# Patient Record
Sex: MALE | Race: WHITE | Employment: FULL TIME | URBAN - METROPOLITAN AREA
[De-identification: names, ages, dates, MRNs, and addresses within clinical notes are randomized per-mention and may not be internally consistent; named-entity substitution may affect disease eponyms.]

---

## 2017-05-03 ENCOUNTER — HOSPITAL ENCOUNTER (OUTPATIENT)
Dept: PHYSICAL THERAPY | Age: 47
Discharge: HOME | End: 2017-05-03
Payer: COMMERCIAL

## 2017-05-03 PROCEDURE — 97162 PT EVAL MOD COMPLEX 30 MIN: CPT

## 2017-05-04 NOTE — PROGRESS NOTES
Ambulatory/Rehab Services H2 Model Falls Risk Assessment    Risk Factor Pts. ·   Confusion/Disorientation/Impulsivity  []    4 ·   Symptomatic Depression  []   2 ·   Altered Elimination  []   1 ·   Dizziness/Vertigo  []   1 ·   Gender (Male)  [x]   1 ·   Any administered antiepileptics (anticonvulsants):  []   2 ·   Any administered benzodiazepines:  []   1 ·   Visual Impairment (specify):  []   1 ·   Portable Oxygen Use  []   1 ·   Orthostatic ? BP  []   1 ·   History of Recent Falls (within 3 mos.)  []   5     Ability to Rise from Chair (choose one) Pts. ·   Ability to rise in a single movement  [x]   0 ·   Pushes up, successful in one attempt  []   1 ·   Multiple attempts, but successful  []   3 ·   Unable to rise without assistance  []   4   Total: (5 or greater = High Risk) 1     Falls Prevention Plan:   []                Physical Limitations to Exercise (specify):   []                Mobility Assistance Device (type):   []                Exercise/Equipment Adaptation (specify):    ©2010 [facility] [address] Patent #[id number].  Federal Law prohibits the replication, distribution or use without written permission from [facility] [employer]

## 2017-05-04 NOTE — PROGRESS NOTES
Riri Hall  : 1970 [facility] at [address] [place] [address]  [phone]   [phone]        OUTPATIENT PHYSICAL THERAPY:Initial Assessment and Daily Note 5/3/2017    ICD-10: Treatment Diagnosis: cervicalgia M54.2  ICD-10: Treatment Diagnosis: low back pain M54.5  Precautions/Allergies: penicillin   Fall Risk Score: 1 (? 5 = High Risk)  MD Orders: self-referred MEDICAL/REFERRING DIAGNOSIS:Low back pain [M54.5]   DATE OF ONSET: chronic, 2009(lumbar spine),  cervical spine  REFERRING PHYSICIAN: Referred, Self, MD  RETURN PHYSICIAN APPOINTMENT: as needed     INITIAL ASSESSMENT:  Mr. Viv Rubio is a 52 y.o. male who presents to physical therapy with chronic cervical and lumbar spine pain. He presents with cervical, thoracic and lumbar spine arthrokinematic dysfunctions, soft tissue restrictions, postural deficits and strength deficits. He would benefit from physical therapy to improve overall mobility and function. PROBLEM LIST (Impacting functional limitations):  1. Decreased Strength  2. Decreased ADL/Functional Activities  3. Decreased Ambulation Ability/Technique  4. Increased Pain  5. Decreased Activity Tolerance  6. Decreased Flexibility/Joint Mobility INTERVENTIONS PLANNED:  1. Home Exercise Program (HEP)  2. Manual Therapy  3. Neuromuscular Re-education/Strengthening  4. Range of Motion (ROM)  5. Therapeutic Activites  6. Therapeutic Exercise/Strengthening   TREATMENT PLAN:  Effective Dates: 5/3/17 TO 17. Frequency/Duration: 1x a week for 4 weeks. GOALS: (Goals have been discussed and agreed upon with patient.)  Short-Term Functional Goals: Time Frame: 2 weeks  1. Patient demonstrates independence with his HEP without verbal cueing from therapist.  2. Patient able to sleep for 6 consecutive hours without awoke from pain. Discharge Goals: Time Frame: 4 weeks  1.  Patient improve core and postural stability strength to 4/5 to perform ADLs  2. Patient demonstrates neutral pelvic sitting and standing posture without verbal cueing from therapist.   3. Improve Oswestry outcome measure score from 15/50 to 10/50 to perform ADLs. Rehabilitation Potential For Stated Goals: Excellent  Regarding Shaw Aldrich's therapy, I certify that the treatment plan above will be carried out by a therapist or under their direction. Thank you for this referral,  Modesto Fofana PT     Referring Physician Signature: Referred, Self, MD              Date                    The information in this section was collected on 5/3/17 (except where otherwise noted). HISTORY:   History of Present Injury/Illness (Reason for Referral):  Chronic history of low back pain since March 2009, with relief from therapy services. He continues to perform exercises that were taught to him for his low back pain. He also has chronic cervical spine pain which started from a work injury in 2016, with referral into RUE symptoms including tingling/numbness into right hand. Increased symptoms with exercises, sleeping, sitting, standing and housework. Increased headaches noted. Patient denies dizziness, drop attacks, numbness/tingling(exception of RUE), bowel/bladder dysfunction and/or unexplained weight gain/loss. Past Medical History/Comorbidities:   Mr. Shilo quintana has past medical history of anxiety, joint pain and recurrent headaches. Social History/Living Environment:     Lives in an apartment, has three boys. 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 works out daily including aerobics, [employer] and yoga. Dominant Side:         RIGHT  Current Medications:     No current outpatient prescriptions on file.    Date Last Reviewed:  5/3/17   Number of Personal Factors/Comorbidities that affect the Plan of Care: 1-2: MODERATE COMPLEXITY   EXAMINATION: Observation/Orthostatic Postural Assessment:          Patient denies any increase of symptoms with coughing, sneezing or valsalva maneuver. Patient denies any changes in vision, dizziness, vertigo, nausea, drop attacks, black outs, tinnitus, dysphagia, dysarthria, LE symptoms or bowel/bladder dysfunction. Patient exhibits a neutral cervical lordosis and slight increased thoracic kyphosis with convex right type I curve mid-thoracic spine. Patient exhibits a increased lumbar lordosis. Shoulder symmetry exhibits bilateral protraction. Shoulder girdles are right elevated compared to left. Scapular position is right elevated. Clavicles are right elevated. Lower quadrant bony landmarks are left iliac crest elevated, level greater trochanters. Vertical compression test (VCT) for alignment is 3. Elbow flexion test (EFT) for motor activation is 3. Soft tissue observations indicates restrictions noted in anterior chest, anterior cervical spine, right greater than left upper trapezius and levator scapula, scalenes, bilateral pectoralis major/minor, bilateral iliopsoas and hamstrings. Palpation:  Myofascial assessment indicates restriction noted in anterior chest. Muscle length testing levator scapulae with ipsilateral arm abduction is restricted right. Upper trapezius length is restricted right. Pectoralis minor/major and latissimus dorsi exhibit restricted right greater than left. Breathing assessment indicates decreased inlation right. Ist rib palpation exhibits decreased p/a and inferior glides of right 1st rib. Scalene muscle length is restricted right greater than left. ROM: Gross active cervical spine rotation is 80% available on the R and 80% available of the L. Active cervical spine flexion is 5% restricted. Active cervical spine extension is 5% restricted. R side bending is 4 finger breaths ear to shoulder. L side bending is 4 finger breaths ear to shoulder.   Upper cervical active nodding/tilting is restricted right. Mid/lower cervical spine PROM is decreased right a/p glide C5-7. FRS right at L4-5 with limitations into extension, rotation and side bending left, moderate restrictions. Strength: Functional strength testing through cervical spine 4-/5. Postural stability strength 4-/5. Core stability 3+/5, unable to activate L4-S1 left multifidi    Special Tests:   ·  Sharp-Ta test is not tested  ·  cranial atlas lift is negative  ·  [place]-Axis shear test is negative  ·  [place]-Axis side flexion test for integrity of alar ligament is negative  ·  Tectorial membrane test is negative. ·  Spurling test is negative. ·  Cervical distraction is negative. ·  Cervical compression is negative. · Hautant's test is not tested. (Vertebral-Basilar Screen)  ·  Negative bilateral Scours, negative  FABERs  Neurological Screen:  Myotomes: Key muscle strength testing for bilateral UE and LE intact. Dermatomes: Sensation testing through bilateral upper quadrants and lower quadrants for light touch is intact. Reflexes: Biceps (C5), brachioradialis (C6), and triceps (C7) are 2+ and WFL, . Neural tension tests: Upper limb tension test A is negative. Slump test is negative. Functional Mobility:  Restrictions noted with sitting, prolonged standing. Body Structures Involved:  1. Nerves  2. Thoracic Cage  3. Bones  4. Joints  5. Muscles Body Functions Affected:  1. Sensory/Pain  2. Neuromusculoskeletal  3. Movement Related Activities and Participation Affected:  1. Mobility  2. Self Care  3. Community, Social and [place]   Number of elements (examined above) that affect the Plan of Care: 3: MODERATE COMPLEXITY   CLINICAL PRESENTATION:   Presentation: Evolving clinical presentation with changing clinical characteristics: MODERATE COMPLEXITY   CLINICAL DECISION MAKING:   Outcome Measure:    Tool Used: Modified Oswestry Low Back Pain Questionnaire  Score:  Initial: 15/50  Most Recent: X/50 (Date: -- )   Interpretation of Score: Each section is scored on a 0-5 scale, 5 representing the greatest disability. The scores of each section are added together for a total score of 50. Medical Necessity:   · Patient is expected to demonstrate progress in strength, range of motion, coordination and functional technique to increase independence with daily activities, work activities and sitting techniques. .  Reason for Services/Other Comments:  · Patient continues to require skilled intervention due to challenged with performing daily activities secondary to pain levels. .   Use of outcome tool(s) and clinical judgement create a POC that gives a: Questionable prediction of patient's progress: MODERATE COMPLEXITY            TREATMENT:   (In addition to Assessment/Re-Assessment sessions the following treatments were rendered)  Pre-treatment Symptoms/Complaints:  Increased cervical and lumbar spine pain over the past few weeks. Pain: Initial:   Pain Intensity 1: 6  Pain Location 1: Spine, cervical, Spine, thoracic, Spine, lumbar  Pain Orientation 1: Right  Post Session:  4/10     Therapeutic Exercise: (5 Minutes):  Exercises per grid below to improve mobility, strength, balance and coordination. Required minimal verbal and manual cues to promote proper body alignment, promote proper body posture, promote proper body mechanics and promote proper body breathing techniques. Progressed resistance, range, repetitions and complexity of movement as indicated. Date:  5/3/17   Activity/Exercise Parameters   Patient education X 5 minute sleeping positions, anatomy and sitting techniques. Review self scalenes stretch and right 1st rib inferior glide with belt                             Manual Therapy (    Soft Tissue Mobilization Duration  Duration: 5 Minutes): Manual techniques to facilitate improved motion and decreased pain.   · Supine soft tissue mobilization anterior chest and cervical spine  · Supine right 1st rib inferior glide with breathing techniques. Treatment/Session Assessment:    · Response to Treatment:  Improved awareness of prognosis of therapy, improved right 1st rib mobility at end of session. .  · Compliance with Program/Exercises: compliant all of the time. · Recommendations/Intent for next treatment session: \"Next visit will focus on advancements to more challenging activities\".   Total Treatment Duration:  PT Patient Time In/Time Out  Time In: 5000  Time Out: [address] [facility] Baron Dacosta, PT

## 2017-05-09 ENCOUNTER — HOSPITAL ENCOUNTER (OUTPATIENT)
Dept: PHYSICAL THERAPY | Age: 47
Discharge: HOME | End: 2017-05-09
Payer: COMMERCIAL

## 2017-05-09 PROCEDURE — 97110 THERAPEUTIC EXERCISES: CPT

## 2017-05-09 PROCEDURE — 97140 MANUAL THERAPY 1/> REGIONS: CPT

## 2017-05-10 NOTE — PROGRESS NOTES
Simi Grimes  : 1970 [facility] at [address]  [phone]   [phone]        OUTPATIENT PHYSICAL THERAPY:Daily Note 2017    ICD-10: Treatment Diagnosis: cervicalgia M54.2  ICD-10: Treatment Diagnosis: low back pain M54.5  Precautions/Allergies: penicillin   Fall Risk Score: 1 (? 5 = High Risk)  MD Orders: self-referred MEDICAL/REFERRING DIAGNOSIS:Low back pain [M54.5]   DATE OF ONSET: chronic, 2009(lumbar spine),  cervical spine  REFERRING PHYSICIAN: Referred, Self, MD  RETURN PHYSICIAN APPOINTMENT: as needed     INITIAL ASSESSMENT:  Mr. Shilo quintana is a 52 y.o. male who presents to physical therapy with chronic cervical and lumbar spine pain. He presents with cervical, thoracic and lumbar spine arthrokinematic dysfunctions, soft tissue restrictions, postural deficits and strength deficits. He would benefit from physical therapy to improve overall mobility and function. PROBLEM LIST (Impacting functional limitations):  1. Decreased Strength  2. Decreased ADL/Functional Activities  3. Decreased Ambulation Ability/Technique  4. Increased Pain  5. Decreased Activity Tolerance  6. Decreased Flexibility/Joint Mobility INTERVENTIONS PLANNED:  1. Home Exercise Program (HEP)  2. Manual Therapy  3. Neuromuscular Re-education/Strengthening  4. Range of Motion (ROM)  5. Therapeutic Activites  6. Therapeutic Exercise/Strengthening   TREATMENT PLAN:  Effective Dates: 5/3/17 TO 17. Frequency/Duration: 1x a week for 4 weeks. GOALS: (Goals have been discussed and agreed upon with patient.)  Short-Term Functional Goals: Time Frame: 2 weeks  1. Patient demonstrates independence with his HEP without verbal cueing from therapist.  2. Patient able to sleep for 6 consecutive hours without awoke from pain. Discharge Goals: Time Frame: 4 weeks  1.  Patient improve core and postural stability strength to 4/5 to perform ADLs  2. Patient demonstrates neutral pelvic sitting and standing posture without verbal cueing from therapist.   3. Improve Oswestry outcome measure score from 15/50 to 10/50 to perform ADLs. Rehabilitation Potential For Stated Goals: Excellent  Regarding Shaw Aldrich's therapy, I certify that the treatment plan above will be carried out by a therapist or under their direction. Thank you for this referral,  Austin Quezada PT     Referring Physician Signature: Referred, Self, MD              Date                    The information in this section was collected on 5/3/17 (except where otherwise noted). HISTORY:   History of Present Injury/Illness (Reason for Referral):  Chronic history of low back pain since March 2009, with relief from therapy services. He continues to perform exercises that were taught to him for his low back pain. He also has chronic cervical spine pain which started from a work injury in 2016, with referral into RUE symptoms including tingling/numbness into right hand. Increased symptoms with exercises, sleeping, sitting, standing and housework. Increased headaches noted. Patient denies dizziness, drop attacks, numbness/tingling(exception of RUE), bowel/bladder dysfunction and/or unexplained weight gain/loss. Past Medical History/Comorbidities:   Mr. Shilo quintana has past medical history of anxiety, joint pain and recurrent headaches. Social History/Living Environment:     Lives in an apartment, has three boys. 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 works out daily including aerobics, [employer] and yoga. Dominant Side:         RIGHT  Current Medications:     No current outpatient prescriptions on file.    Date Last Reviewed:  5/3/17   Number of Personal Factors/Comorbidities that affect the Plan of Care: 1-2: MODERATE COMPLEXITY   EXAMINATION: Observation/Orthostatic Postural Assessment:          Patient denies any increase of symptoms with coughing, sneezing or valsalva maneuver. Patient denies any changes in vision, dizziness, vertigo, nausea, drop attacks, black outs, tinnitus, dysphagia, dysarthria, LE symptoms or bowel/bladder dysfunction. Patient exhibits a neutral cervical lordosis and slight increased thoracic kyphosis with convex right type I curve mid-thoracic spine. Patient exhibits a increased lumbar lordosis. Shoulder symmetry exhibits bilateral protraction. Shoulder girdles are right elevated compared to left. Scapular position is right elevated. Clavicles are right elevated. Lower quadrant bony landmarks are left iliac crest elevated, level greater trochanters. Vertical compression test (VCT) for alignment is 3. Elbow flexion test (EFT) for motor activation is 3. Soft tissue observations indicates restrictions noted in anterior chest, anterior cervical spine, right greater than left upper trapezius and levator scapula, scalenes, bilateral pectoralis major/minor, bilateral iliopsoas and hamstrings. Palpation:  Myofascial assessment indicates restriction noted in anterior chest. Muscle length testing levator scapulae with ipsilateral arm abduction is restricted right. Upper trapezius length is restricted right. Pectoralis minor/major and latissimus dorsi exhibit restricted right greater than left. Breathing assessment indicates decreased inlation right. Ist rib palpation exhibits decreased p/a and inferior glides of right 1st rib. Scalene muscle length is restricted right greater than left. ROM: Gross active cervical spine rotation is 80% available on the R and 80% available of the L. Active cervical spine flexion is 5% restricted. Active cervical spine extension is 5% restricted. R side bending is 4 finger breaths ear to shoulder. L side bending is 4 finger breaths ear to shoulder.   Upper cervical active nodding/tilting is restricted right. Mid/lower cervical spine PROM is decreased right a/p glide C5-7. FRS right at L4-5 with limitations into extension, rotation and side bending left, moderate restrictions. Strength: Functional strength testing through cervical spine 4-/5. Postural stability strength 4-/5. Core stability 3+/5, unable to activate L4-S1 left multifidi    Special Tests:   ·  Sharp-Ta test is not tested  ·  cranial atlas lift is negative  ·  [place]-Axis shear test is negative  ·  [place]-Axis side flexion test for integrity of alar ligament is negative  ·  Tectorial membrane test is negative. ·  Spurling test is negative. ·  Cervical distraction is negative. ·  Cervical compression is negative. · Hautant's test is not tested. (Vertebral-Basilar Screen)  ·  Negative bilateral Scours, negative  FABERs  Neurological Screen:  Myotomes: Key muscle strength testing for bilateral UE and LE intact. Dermatomes: Sensation testing through bilateral upper quadrants and lower quadrants for light touch is intact. Reflexes: Biceps (C5), brachioradialis (C6), and triceps (C7) are 2+ and WFL, . Neural tension tests: Upper limb tension test A is negative. Slump test is negative. Functional Mobility:  Restrictions noted with sitting, prolonged standing. Body Structures Involved:  1. Nerves  2. Thoracic Cage  3. Bones  4. Joints  5. Muscles Body Functions Affected:  1. Sensory/Pain  2. Neuromusculoskeletal  3. Movement Related Activities and Participation Affected:  1. Mobility  2. Self Care  3. Community, Social and [place]   Number of elements (examined above) that affect the Plan of Care: 3: MODERATE COMPLEXITY   CLINICAL PRESENTATION:   Presentation: Evolving clinical presentation with changing clinical characteristics: MODERATE COMPLEXITY   CLINICAL DECISION MAKING:   Outcome Measure:    Tool Used: Modified Oswestry Low Back Pain Questionnaire  Score:  Initial: 15/50  Most Recent: X/50 (Date: -- )   Interpretation of Score: Each section is scored on a 0-5 scale, 5 representing the greatest disability. The scores of each section are added together for a total score of 50. Medical Necessity:   · Patient is expected to demonstrate progress in strength, range of motion, coordination and functional technique to increase independence with daily activities, work activities and sitting techniques. .  Reason for Services/Other Comments:  · Patient continues to require skilled intervention due to challenged with performing daily activities secondary to pain levels. .   Use of outcome tool(s) and clinical judgement create a POC that gives a: Questionable prediction of patient's progress: MODERATE COMPLEXITY            TREATMENT:   (In addition to Assessment/Re-Assessment sessions the following treatments were rendered)  Pre-treatment Symptoms/Complaints:  Patient notes numbness into RUE when riding his bicycle. Increased discomfort in cervical spine this week and headaches. Pain: Initial:   Pain Intensity 1: 4  Pain Location 1: Spine, cervical  Pain Orientation 1: Right  Post Session:  4/10     Therapeutic Exercise: (15 Minutes):  Exercises per grid below to improve mobility, strength, balance and coordination. Required minimal verbal and manual cues to promote proper body alignment, promote proper body posture, promote proper body mechanics and promote proper body breathing techniques. Progressed resistance, range, repetitions and complexity of movement as indicated. Date:  5/3/17   Activity/Exercise Parameters   Patient education X 5 minute sleeping positions,HEP   Prone prop scapular stability 4 x 30 sec holds   Lower trunk rotation X 5 reps multifidi activation   Seated scalenes stretch X 5 reps, 10 sec holds                 Manual Therapy (    Soft Tissue Mobilization Duration  Duration: 45 Minutes): Manual techniques to facilitate improved motion and decreased pain.   · Supine soft tissue mobilization anterior chest and cervical spine  · Supine right 1st rib inferior glide with breathing techniques. · Supine bony contour soft tissue mobilization to inferior and superior clavicle and coracoid process  · Supine right UE neural glides: median and ulnar nerves      Treatment/Session Assessment:    · Response to Treatment:  Decreased anterior chest soft tissue restrictions, improved awareness of postural stability. Encouraged patient to raise his handle bars of bicycle for improved positioning. · Compliance with Program/Exercises: compliant all of the time. · Recommendations/Intent for next treatment session: \"Next visit will focus on advancements to more challenging activities\".   Total Treatment Duration:  PT Patient Time In/Time Out  Time In: 1430  Time Out: [address] [facility], PT

## 2017-05-16 ENCOUNTER — HOSPITAL ENCOUNTER (OUTPATIENT)
Dept: PHYSICAL THERAPY | Age: 47
Discharge: HOME | End: 2017-05-16
Payer: COMMERCIAL

## 2017-05-16 PROCEDURE — 97110 THERAPEUTIC EXERCISES: CPT

## 2017-05-16 PROCEDURE — 97140 MANUAL THERAPY 1/> REGIONS: CPT

## 2017-05-16 NOTE — PROGRESS NOTES
Sulema Molina  : 1970 [facility]  [facility], [address] [facility]  [phone]   [phone]        OUTPATIENT PHYSICAL THERAPY:Daily Note 2017    ICD-10: Treatment Diagnosis: cervicalgia M54.2  ICD-10: Treatment Diagnosis: low back pain M54.5  Precautions/Allergies: penicillin   Fall Risk Score: 1 (? 5 = High Risk)  MD Orders: self-referred MEDICAL/REFERRING DIAGNOSIS:Low back pain [M54.5]   DATE OF ONSET: chronic, 2009(lumbar spine), 2016 cervical spine  REFERRING PHYSICIAN: Referred, Self, MD  RETURN PHYSICIAN APPOINTMENT: as needed     INITIAL ASSESSMENT:  Mr. Shilo quintana is a 52 y.o. male who presents to physical therapy with chronic cervical and lumbar spine pain. He presents with cervical, thoracic and lumbar spine arthrokinematic dysfunctions, soft tissue restrictions, postural deficits and strength deficits. He would benefit from physical therapy to improve overall mobility and function. PROBLEM LIST (Impacting functional limitations):  1. Decreased Strength  2. Decreased ADL/Functional Activities  3. Decreased Ambulation Ability/Technique  4. Increased Pain  5. Decreased Activity Tolerance  6. Decreased Flexibility/Joint Mobility INTERVENTIONS PLANNED:  1. Home Exercise Program (HEP)  2. Manual Therapy  3. Neuromuscular Re-education/Strengthening  4. Range of Motion (ROM)  5. Therapeutic Activites  6. Therapeutic Exercise/Strengthening   TREATMENT PLAN:  Effective Dates: 5/3/17 TO 17. Frequency/Duration: 1x a week for 4 weeks. GOALS: (Goals have been discussed and agreed upon with patient.)  Short-Term Functional Goals: Time Frame: 2 weeks  1. Patient demonstrates independence with his HEP without verbal cueing from therapist.  2. Patient able to sleep for 6 consecutive hours without awoke from pain. Discharge Goals: Time Frame: 4 weeks  1.  Patient improve core and postural stability strength to 4/5 to perform ADLs  2. Patient demonstrates neutral pelvic sitting and standing posture without verbal cueing from therapist.   3. Improve Oswestry outcome measure score from 15/50 to 10/50 to perform ADLs. Rehabilitation Potential For Stated Goals: Excellent  Regarding Shaw Aldrich's therapy, I certify that the treatment plan above will be carried out by a therapist or under their direction. Thank you for this referral,  Mandy Alejandro PT     Referring Physician Signature: Referred, Self, MD              Date                    The information in this section was collected on 5/3/17 (except where otherwise noted). HISTORY:   History of Present Injury/Illness (Reason for Referral):  Chronic history of low back pain since March 2009, with relief from therapy services. He continues to perform exercises that were taught to him for his low back pain. He also has chronic cervical spine pain which started from a work injury in 2016, with referral into RUE symptoms including tingling/numbness into right hand. Increased symptoms with exercises, sleeping, sitting, standing and housework. Increased headaches noted. Patient denies dizziness, drop attacks, numbness/tingling(exception of RUE), bowel/bladder dysfunction and/or unexplained weight gain/loss. Past Medical History/Comorbidities:   Mr. Shilo quintana has past medical history of anxiety, joint pain and recurrent headaches. Social History/Living Environment:     Lives in an apartment, has three boys. 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 works out daily including aerobics, [employer] and yoga. Dominant Side:         RIGHT  Current Medications:     No current outpatient prescriptions on file.    Date Last Reviewed:  5/3/17   Number of Personal Factors/Comorbidities that affect the Plan of Care: 1-2: MODERATE COMPLEXITY   EXAMINATION: Observation/Orthostatic Postural Assessment:          Patient denies any increase of symptoms with coughing, sneezing or valsalva maneuver. Patient denies any changes in vision, dizziness, vertigo, nausea, drop attacks, black outs, tinnitus, dysphagia, dysarthria, LE symptoms or bowel/bladder dysfunction. Patient exhibits a neutral cervical lordosis and slight increased thoracic kyphosis with convex right type I curve mid-thoracic spine. Patient exhibits a increased lumbar lordosis. Shoulder symmetry exhibits bilateral protraction. Shoulder girdles are right elevated compared to left. Scapular position is right elevated. Clavicles are right elevated. Lower quadrant bony landmarks are left iliac crest elevated, level greater trochanters. Vertical compression test (VCT) for alignment is 3. Elbow flexion test (EFT) for motor activation is 3. Soft tissue observations indicates restrictions noted in anterior chest, anterior cervical spine, right greater than left upper trapezius and levator scapula, scalenes, bilateral pectoralis major/minor, bilateral iliopsoas and hamstrings. Palpation:  Myofascial assessment indicates restriction noted in anterior chest. Muscle length testing levator scapulae with ipsilateral arm abduction is restricted right. Upper trapezius length is restricted right. Pectoralis minor/major and latissimus dorsi exhibit restricted right greater than left. Breathing assessment indicates decreased inlation right. Ist rib palpation exhibits decreased p/a and inferior glides of right 1st rib. Scalene muscle length is restricted right greater than left. ROM: Gross active cervical spine rotation is 80% available on the R and 80% available of the L. Active cervical spine flexion is 5% restricted. Active cervical spine extension is 5% restricted. R side bending is 4 finger breaths ear to shoulder. L side bending is 4 finger breaths ear to shoulder.   Upper cervical active nodding/tilting is restricted right. Mid/lower cervical spine PROM is decreased right a/p glide C5-7. FRS right at L4-5 with limitations into extension, rotation and side bending left, moderate restrictions. Strength: Functional strength testing through cervical spine 4-/5. Postural stability strength 4-/5. Core stability 3+/5, unable to activate L4-S1 left multifidi    Special Tests:   ·  Sharp-Ta test is not tested  ·  cranial atlas lift is negative  ·  [place]-Axis shear test is negative  ·  [place]-Axis side flexion test for integrity of alar ligament is negative  ·  Tectorial membrane test is negative. ·  Spurling test is negative. ·  Cervical distraction is negative. ·  Cervical compression is negative. · Hautant's test is not tested. (Vertebral-Basilar Screen)  ·  Negative bilateral Scours, negative  FABERs  Neurological Screen:  Myotomes: Key muscle strength testing for bilateral UE and LE intact. Dermatomes: Sensation testing through bilateral upper quadrants and lower quadrants for light touch is intact. Reflexes: Biceps (C5), brachioradialis (C6), and triceps (C7) are 2+ and WFL, . Neural tension tests: Upper limb tension test A is negative. Slump test is negative. Functional Mobility:  Restrictions noted with sitting, prolonged standing. Body Structures Involved:  1. Nerves  2. Thoracic Cage  3. Bones  4. Joints  5. Muscles Body Functions Affected:  1. Sensory/Pain  2. Neuromusculoskeletal  3. Movement Related Activities and Participation Affected:  1. Mobility  2. Self Care  3. Community, Social and [place]   Number of elements (examined above) that affect the Plan of Care: 3: MODERATE COMPLEXITY   CLINICAL PRESENTATION:   Presentation: Evolving clinical presentation with changing clinical characteristics: MODERATE COMPLEXITY   CLINICAL DECISION MAKING:   Outcome Measure:    Tool Used: Modified Oswestry Low Back Pain Questionnaire  Score:  Initial: 15/50  Most Recent: X/50 (Date: -- )   Interpretation of Score: Each section is scored on a 0-5 scale, 5 representing the greatest disability. The scores of each section are added together for a total score of 50. Medical Necessity:   · Patient is expected to demonstrate progress in strength, range of motion, coordination and functional technique to increase independence with daily activities, work activities and sitting techniques. .  Reason for Services/Other Comments:  · Patient continues to require skilled intervention due to challenged with performing daily activities secondary to pain levels. .   Use of outcome tool(s) and clinical judgement create a POC that gives a: Questionable prediction of patient's progress: MODERATE COMPLEXITY            TREATMENT:   (In addition to Assessment/Re-Assessment sessions the following treatments were rendered)  Pre-treatment Symptoms/Complaints:  Patient notes exercises for low back are doing well, notes continued tension in cervical spine, especially with computer work. Pain: Initial:   Pain Intensity 1: 4  Pain Location 1: Spine, cervical, Spine, lumbar  Pain Orientation 1: Left, Right  Post Session:  4/10     Therapeutic Exercise: (15 Minutes):  Exercises per grid below to improve mobility, strength, balance and coordination. Required minimal verbal and manual cues to promote proper body alignment, promote proper body posture, promote proper body mechanics and promote proper body breathing techniques. Progressed resistance, range, repetitions and complexity of movement as indicated. Date:  5/3/17   Activity/Exercise Parameters   Patient education X 5 minute review of HEP   Prone prop scapular stability 4 x 30 sec holds   Lower trunk rotation X 5 reps multifidi activation   Seated scalenes stretch X 5 reps, 10 sec holds   Sitting posture review X 5 minute review, neutral pelvic position, LE weight bearing.              Manual Therapy (    Soft Tissue Mobilization Duration  Duration: 45 Minutes): Manual techniques to facilitate improved motion and decreased pain. · Supine soft tissue mobilization anterior chest and cervical spine  · Supine right 1st rib inferior glide with breathing techniques. · Supine bony contour soft tissue mobilization to inferior and superior clavicle and coracoid process  · Supine right UE neural glides: median and ulnar nerves  · Supine bilateral iliopsoas release with FMP of lower trunk rotation  · Prone soft tissue mobilization around bony contours of iliac crest and sacral sulcus. Treatment/Session Assessment:    · Response to Treatment:  Improved mobility noted in pelvis and awareness of sitting position,   · Compliance with Program/Exercises: compliant all of the time. · Recommendations/Intent for next treatment session: \"Next visit will focus on advancements to more challenging activities\".   Total Treatment Duration:  PT Patient Time In/Time Out  Time In: 1040  Time Out: 2025 Vishal Thompson, PT

## 2017-05-22 ENCOUNTER — HOSPITAL ENCOUNTER (OUTPATIENT)
Dept: PHYSICAL THERAPY | Age: 47
Discharge: HOME | End: 2017-05-22
Payer: COMMERCIAL

## 2017-05-22 PROCEDURE — 97110 THERAPEUTIC EXERCISES: CPT

## 2017-05-22 PROCEDURE — 97140 MANUAL THERAPY 1/> REGIONS: CPT

## 2017-05-22 NOTE — PROGRESS NOTES
Tonya Mckeon  : 1970 [facility], [address] [place] [address]  [phone]   [phone]        OUTPATIENT PHYSICAL THERAPY:Daily Note 2017    ICD-10: Treatment Diagnosis: cervicalgia M54.2  ICD-10: Treatment Diagnosis: low back pain M54.5  Precautions/Allergies: penicillin   Fall Risk Score: 1 (? 5 = High Risk)  MD Orders: self-referred MEDICAL/REFERRING DIAGNOSIS:Low back pain [M54.5]   DATE OF ONSET: chronic, 2009(lumbar spine), 2016 cervical spine  REFERRING PHYSICIAN: Referred, Self, MD  RETURN PHYSICIAN APPOINTMENT: as needed     INITIAL ASSESSMENT:  Mr. Shilo quintana is a 52 y.o. male who presents to physical therapy with chronic cervical and lumbar spine pain. He presents with cervical, thoracic and lumbar spine arthrokinematic dysfunctions, soft tissue restrictions, postural deficits and strength deficits. He would benefit from physical therapy to improve overall mobility and function. PROBLEM LIST (Impacting functional limitations):  1. Decreased Strength  2. Decreased ADL/Functional Activities  3. Decreased Ambulation Ability/Technique  4. Increased Pain  5. Decreased Activity Tolerance  6. Decreased Flexibility/Joint Mobility INTERVENTIONS PLANNED:  1. Home Exercise Program (HEP)  2. Manual Therapy  3. Neuromuscular Re-education/Strengthening  4. Range of Motion (ROM)  5. Therapeutic Activites  6. Therapeutic Exercise/Strengthening   TREATMENT PLAN:  Effective Dates: 5/3/17 TO 17. Frequency/Duration: 1x a week for 4 weeks. GOALS: (Goals have been discussed and agreed upon with patient.)  Short-Term Functional Goals: Time Frame: 2 weeks  1. Patient demonstrates independence with his HEP without verbal cueing from therapist.  2. Patient able to sleep for 6 consecutive hours without awoke from pain. Discharge Goals: Time Frame: 4 weeks  1.  Patient improve core and postural stability strength to 4/5 to perform ADLs  2. Patient demonstrates neutral pelvic sitting and standing posture without verbal cueing from therapist.   3. Improve Oswestry outcome measure score from 15/50 to 10/50 to perform ADLs. Rehabilitation Potential For Stated Goals: Excellent  Regarding Shaw Aldrich's therapy, I certify that the treatment plan above will be carried out by a therapist or under their direction. Thank you for this referral,  Marilee Rosas PT     Referring Physician Signature: Referred, Self, MD              Date                    The information in this section was collected on 5/3/17 (except where otherwise noted). HISTORY:   History of Present Injury/Illness (Reason for Referral):  Chronic history of low back pain since March 2009, with relief from therapy services. He continues to perform exercises that were taught to him for his low back pain. He also has chronic cervical spine pain which started from a work injury in 2016, with referral into RUE symptoms including tingling/numbness into right hand. Increased symptoms with exercises, sleeping, sitting, standing and housework. Increased headaches noted. Patient denies dizziness, drop attacks, numbness/tingling(exception of RUE), bowel/bladder dysfunction and/or unexplained weight gain/loss. Past Medical History/Comorbidities:   Mr. Eliecer Stubbs has past medical history of anxiety, joint pain and recurrent headaches. Social History/Living Environment:     Lives in an apartment, has three boys. 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 works out daily including aerobics, [employer] and yoga. Dominant Side:         RIGHT  Current Medications:     No current outpatient prescriptions on file.    Date Last Reviewed:  5/22/2017   Number of Personal Factors/Comorbidities that affect the Plan of Care: 1-2: MODERATE COMPLEXITY   EXAMINATION: Observation/Orthostatic Postural Assessment:          Patient denies any increase of symptoms with coughing, sneezing or valsalva maneuver. Patient denies any changes in vision, dizziness, vertigo, nausea, drop attacks, black outs, tinnitus, dysphagia, dysarthria, LE symptoms or bowel/bladder dysfunction. Patient exhibits a neutral cervical lordosis and slight increased thoracic kyphosis with convex right type I curve mid-thoracic spine. Patient exhibits a increased lumbar lordosis. Shoulder symmetry exhibits bilateral protraction. Shoulder girdles are right elevated compared to left. Scapular position is right elevated. Clavicles are right elevated. Lower quadrant bony landmarks are left iliac crest elevated, level greater trochanters. Vertical compression test (VCT) for alignment is 3. Elbow flexion test (EFT) for motor activation is 3. Soft tissue observations indicates restrictions noted in anterior chest, anterior cervical spine, right greater than left upper trapezius and levator scapula, scalenes, bilateral pectoralis major/minor, bilateral iliopsoas and hamstrings. Palpation:  Myofascial assessment indicates restriction noted in anterior chest. Muscle length testing levator scapulae with ipsilateral arm abduction is restricted right. Upper trapezius length is restricted right. Pectoralis minor/major and latissimus dorsi exhibit restricted right greater than left. Breathing assessment indicates decreased inlation right. Ist rib palpation exhibits decreased p/a and inferior glides of right 1st rib. Scalene muscle length is restricted right greater than left. ROM: Gross active cervical spine rotation is 80% available on the R and 80% available of the L. Active cervical spine flexion is 5% restricted. Active cervical spine extension is 5% restricted. R side bending is 4 finger breaths ear to shoulder. L side bending is 4 finger breaths ear to shoulder.   Upper cervical active nodding/tilting is restricted right. Mid/lower cervical spine PROM is decreased right a/p glide C5-7. FRS right at L4-5 with limitations into extension, rotation and side bending left, moderate restrictions. Strength: Functional strength testing through cervical spine 4-/5. Postural stability strength 4-/5. Core stability 3+/5, unable to activate L4-S1 left multifidi    Special Tests:   ·  Sharp-Ta test is not tested  ·  cranial atlas lift is negative  ·  [place]-Axis shear test is negative  ·  [place]-Axis side flexion test for integrity of alar ligament is negative  ·  Tectorial membrane test is negative. ·  Spurling test is negative. ·  Cervical distraction is negative. ·  Cervical compression is negative. · Hautant's test is not tested. (Vertebral-Basilar Screen)  ·  Negative bilateral Scours, negative  FABERs  Neurological Screen:  Myotomes: Key muscle strength testing for bilateral UE and LE intact. Dermatomes: Sensation testing through bilateral upper quadrants and lower quadrants for light touch is intact. Reflexes: Biceps (C5), brachioradialis (C6), and triceps (C7) are 2+ and WFL, . Neural tension tests: Upper limb tension test A is negative. Slump test is negative. Functional Mobility:  Restrictions noted with sitting, prolonged standing. Body Structures Involved:  1. Nerves  2. Thoracic Cage  3. Bones  4. Joints  5. Muscles Body Functions Affected:  1. Sensory/Pain  2. Neuromusculoskeletal  3. Movement Related Activities and Participation Affected:  1. Mobility  2. Self Care  3. Community, Social and [place]   Number of elements (examined above) that affect the Plan of Care: 3: MODERATE COMPLEXITY   CLINICAL PRESENTATION:   Presentation: Evolving clinical presentation with changing clinical characteristics: MODERATE COMPLEXITY   CLINICAL DECISION MAKING:   Outcome Measure:    Tool Used: Modified Oswestry Low Back Pain Questionnaire  Score:  Initial: 15/50  Most Recent: X/50 (Date: -- )   Interpretation of Score: Each section is scored on a 0-5 scale, 5 representing the greatest disability. The scores of each section are added together for a total score of 50. Medical Necessity:   · Patient is expected to demonstrate progress in strength, range of motion, coordination and functional technique to increase independence with daily activities, work activities and sitting techniques. .  Reason for Services/Other Comments:  · Patient continues to require skilled intervention due to challenged with performing daily activities secondary to pain levels. .   Use of outcome tool(s) and clinical judgement create a POC that gives a: Questionable prediction of patient's progress: MODERATE COMPLEXITY            TREATMENT:   (In addition to Assessment/Re-Assessment sessions the following treatments were rendered)  Pre-treatment Symptoms/Complaints:  Patient notes a few twinges in his low back this weekend, also notes stabbing pain into his right shoulder after working out . Pain: Initial:   Pain Intensity 1: 3  Pain Location 1: Spine, cervical, Spine, lumbar  Pain Orientation 1: Right  Post Session:  2/10     Therapeutic Exercise: (15 Minutes):  Exercises per grid below to improve mobility, strength, balance and coordination. Required minimal verbal and manual cues to promote proper body alignment, promote proper body posture, promote proper body mechanics and promote proper body breathing techniques. Progressed resistance, range, repetitions and complexity of movement as indicated.    Date:  5/22/2017   Activity/Exercise Parameters   Patient education X 5 minute review of HEP   Prone prop scapular stability 4 x 30 sec holds   Lower trunk rotation X 5 reps multifidi activation   Seated scalenes stretch    Sitting posture review    Quadriped pelvic posterior depression, ball into wall X 10 reps, 5 sec holds   Side lying hip abduction/posterior depression against wall X 10 reps, 5 sec holds     Manual Therapy (    Soft Tissue Mobilization Duration  Duration: 45 Minutes): Manual techniques to facilitate improved motion and decreased pain. · Supine soft tissue mobilization anterior chest and cervical spine  · Supine soft tissue mobilization with FMP to right latissimus dorsi and subscapularis  · Supine right 1st rib inferior glide with breathing techniques. · Supine bony contour soft tissue mobilization to inferior and superior clavicle and coracoid process  · Supine right UE neural glides: median and ulnar nerves  · Supine bilateral iliopsoas release with FMP of lower trunk rotation  · Prone soft tissue mobilization around bony contours of iliac crest and sacral sulcus. Treatment/Session Assessment:    · Response to Treatment:  Decreased soft tissue restrictions of right chest and improved position of right shoulder girdle, less protraction noted. Improved awareness of gluteal activation and ability to obtain pelvic posterior depression without tactile cueing. · Compliance with Program/Exercises: compliant all of the time. · Recommendations/Intent for next treatment session: \"Next visit will focus on advancements to more challenging activities\".   Total Treatment Duration:  PT Patient Time In/Time Out  Time In: 8750  Time Out: 1266 [address] Stephy Cullen PT

## 2017-05-31 ENCOUNTER — APPOINTMENT (OUTPATIENT)
Dept: PHYSICAL THERAPY | Age: 47
End: 2017-05-31
Payer: COMMERCIAL

## 2017-06-01 ENCOUNTER — HOSPITAL ENCOUNTER (OUTPATIENT)
Dept: PHYSICAL THERAPY | Age: 47
Discharge: HOME | End: 2017-06-01
Payer: COMMERCIAL

## 2017-06-01 PROCEDURE — 97110 THERAPEUTIC EXERCISES: CPT

## 2017-06-01 PROCEDURE — 97140 MANUAL THERAPY 1/> REGIONS: CPT

## 2017-06-01 NOTE — PROGRESS NOTES
Carlos Marcano  : 1970 [facility] at [address] [place] [address]  [phone]   [phone]        OUTPATIENT PHYSICAL THERAPY:Daily Note and Discharge 2017    ICD-10: Treatment Diagnosis: cervicalgia M54.2  ICD-10: Treatment Diagnosis: low back pain M54.5  Precautions/Allergies: penicillin   Fall Risk Score: 1 (? 5 = High Risk)  MD Orders: self-referred MEDICAL/REFERRING DIAGNOSIS:Low back pain [M54.5]   DATE OF ONSET: chronic, 2009(lumbar spine),  cervical spine  REFERRING PHYSICIAN: Referred, Self, MD  RETURN PHYSICIAN APPOINTMENT: as needed     INITIAL ASSESSMENT:  Mr. Shilo quintana is a 52 y.o. male who presents to physical therapy with chronic cervical and lumbar spine pain. He presents with cervical, thoracic and lumbar spine arthrokinematic dysfunctions, soft tissue restrictions, postural deficits and strength deficits. He would benefit from physical therapy to improve overall mobility and function. 17: Patient has met majority of his physical therapy goals and demonstrates independence with his HEP. He will continue to work independently to improve overall mobility and strength. He attended 5 physical therapy sessions. He will be discharged from therapy at this time. TREATMENT PLAN:  Effective Dates: 5/3/17 TO 17 (correction to dates, allowed 30 days direct access)   Frequency/Duration: 1x a week for 4 weeks. GOALS: (Goals have been discussed and agreed upon with patient.)  Short-Term Functional Goals: Time Frame: 2 weeks  1. Patient demonstrates independence with his HEP without verbal cueing from therapist. GOAL MET  2. Patient able to sleep for 6 consecutive hours without awoke from pain. GOAL MET  Discharge Goals: Time Frame: 4 weeks  1. Patient improve core and postural stability strength to 4/5 to perform ADLs GOAL MET  2.  Patient demonstrates neutral pelvic sitting and standing posture without verbal cueing from therapist. GOAL MET   3. Improve Oswestry outcome measure score from 15/50 to 10/50 to perform ADLs. ALMOST MET  Rehabilitation Potential For Stated Goals: Excellent  Regarding Shaw Aldrich's therapy, I certify that the treatment plan above will be carried out by a therapist or under their direction. Thank you for this referral,  Kerry Garnett PT     Referring Physician Signature: Referred, Self, MD        n/a     Date                    The information in this section was collected on 5/3/17 (except where otherwise noted). HISTORY:   History of Present Injury/Illness (Reason for Referral):  Chronic history of low back pain since March 2009, with relief from therapy services. He continues to perform exercises that were taught to him for his low back pain. He also has chronic cervical spine pain which started from a work injury in 2016, with referral into RUE symptoms including tingling/numbness into right hand. Increased symptoms with exercises, sleeping, sitting, standing and housework. Increased headaches noted. Patient denies dizziness, drop attacks, numbness/tingling(exception of RUE), bowel/bladder dysfunction and/or unexplained weight gain/loss. Past Medical History/Comorbidities:   Mr. Shilo quintana has past medical history of anxiety, joint pain and recurrent headaches. Social History/Living Environment:     Lives in an apartment, has three boys. 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 works out daily including aerobics, [employer] and yoga. Dominant Side:         RIGHT  Current Medications:     No current outpatient prescriptions on file.    Date Last Reviewed:  6/1/2017   Number of Personal Factors/Comorbidities that affect the Plan of Care: 1-2: MODERATE COMPLEXITY   EXAMINATION:   Observation/Orthostatic Postural Assessment:          Patient denies any increase of symptoms with coughing, sneezing or valsalva maneuver. Patient denies any changes in vision, dizziness, vertigo, nausea, drop attacks, black outs, tinnitus, dysphagia, dysarthria, LE symptoms or bowel/bladder dysfunction. Patient exhibits a neutral cervical lordosis and slight increased thoracic kyphosis with convex right type I curve mid-thoracic spine. Patient exhibits a increased lumbar lordosis. Shoulder symmetry exhibits bilateral protraction. Shoulder girdles are right elevated compared to left. Scapular position is right elevated. Clavicles are right elevated. Lower quadrant bony landmarks are left iliac crest elevated, level greater trochanters. Vertical compression test (VCT) for alignment is 3. Elbow flexion test (EFT) for motor activation is 3. Soft tissue observations indicates restrictions noted in anterior chest, anterior cervical spine, right greater than left upper trapezius and levator scapula, scalenes, bilateral pectoralis major/minor, bilateral iliopsoas and hamstrings. improved  Palpation:  Myofascial assessment indicates restriction noted in anterior chest. Muscle length testing levator scapulae with ipsilateral arm abduction is restricted right. Upper trapezius length is restricted right. Pectoralis minor/major and latissimus dorsi exhibit restricted right greater than left. Breathing assessment indicates decreased inlation right. Ist rib palpation exhibits decreased p/a and inferior glides of right 1st rib improved. Scalene muscle length is restricted right greater than left. improved    ROM: Gross active cervical spine rotation is 80% available on the R and 80% available of the L. Active cervical spine flexion is 5% restricted. Active cervical spine extension is 5% restricted. R side bending is 4 finger breaths ear to shoulder. L side bending is 4 finger breaths ear to shoulder. Upper cervical active nodding/tilting is restricted right.   Mid/lower cervical spine PROM is decreased right a/p glide C5-7. FRS right at L4-5 with limitations into extension, rotation and side bending left, moderate restrictions. Strength: Functional strength testing through cervical spine 4-/5. Postural stability strength 4/5. Core stability 4/5, unable to activate L4-S1 left multifidi   improved  Special Tests:   ·  Sharp-Ta test is not tested  ·  cranial atlas lift is negative  ·  [place]-Axis shear test is negative  ·  [place]-Axis side flexion test for integrity of alar ligament is negative  ·  Tectorial membrane test is negative. ·  Spurling test is negative. ·  Cervical distraction is negative. ·  Cervical compression is negative. · Hautant's test is not tested. (Vertebral-Basilar Screen)  ·  Negative bilateral Scours, negative  FABERs  Neurological Screen:  Myotomes: Key muscle strength testing for bilateral UE and LE intact. Dermatomes: Sensation testing through bilateral upper quadrants and lower quadrants for light touch is intact. Reflexes: Biceps (C5), brachioradialis (C6), and triceps (C7) are 2+ and WFL, . Neural tension tests: Upper limb tension test A is negative. Slump test is negative. Functional Mobility:  Restrictions noted with sitting, prolonged standing. Body Structures Involved:  1. Nerves  2. Thoracic Cage  3. Bones  4. Joints  5. Muscles Body Functions Affected:  1. Sensory/Pain  2. Neuromusculoskeletal  3. Movement Related Activities and Participation Affected:  1. Mobility  2. Self Care  3. Community, Social and [place]   Number of elements (examined above) that affect the Plan of Care: 3: MODERATE COMPLEXITY   CLINICAL PRESENTATION:   Presentation: Evolving clinical presentation with changing clinical characteristics: MODERATE COMPLEXITY   CLINICAL DECISION MAKING:   Outcome Measure:    Tool Used: Modified Oswestry Low Back Pain Questionnaire  Score:  Initial: 15/50  Most Recent: 11/50 (Date: 6/1/17)   Interpretation of Score: Each section is scored on a 0-5 scale, 5 representing the greatest disability. The scores of each section are added together for a total score of 50. Medical Necessity:   · Patient is expected to demonstrate progress in strength, range of motion, coordination and functional technique to increase independence with daily activities, work activities and sitting techniques. .  Reason for Services/Other Comments:  · Patient continues to require skilled intervention due to challenged with performing daily activities secondary to pain levels. .   Use of outcome tool(s) and clinical judgement create a POC that gives a: Questionable prediction of patient's progress: MODERATE COMPLEXITY            TREATMENT:   (In addition to Assessment/Re-Assessment sessions the following treatments were rendered)  Pre-treatment Symptoms/Complaints:  Patient notes overall improvements, less pain in cervical and lumbar spine pain. Recently traveled and noted some low back pain, however was able to adjust his posture which helped decrease symptoms. Pain: Initial:   Pain Intensity 1: 2  Pain Location 1: Spine, cervical, Spine, lumbar  Pain Orientation 1: Right  Post Session:  1/10     Therapeutic Exercise: (10 Minutes):  Exercises per grid below to improve mobility, strength, balance and coordination. Required minimal verbal and manual cues to promote proper body alignment, promote proper body posture, promote proper body mechanics and promote proper body breathing techniques. Progressed resistance, range, repetitions and complexity of movement as indicated.    Date:  6/1/2017   Activity/Exercise Parameters   Patient education X 5 minute review of HEP   Prone prop scapular stability 4 x 30 sec holds   Lower trunk rotation X 5 reps multifidi activation   Seated scalenes stretch    Sitting posture review    Quadriped pelvic posterior depression, ball into wall X 10 reps, 5 sec holds   Side lying hip abduction/posterior depression against wall X 10 reps, 5 sec holds     Manual Therapy (    Soft Tissue Mobilization Duration  Duration: 50 Minutes): Manual techniques to facilitate improved motion and decreased pain. · Supine soft tissue mobilization anterior chest and cervical spine  · Supine soft tissue mobilization with FMP to right latissimus dorsi and subscapularis  · Supine right 1st rib inferior glide with breathing techniques. · Supine bony contour soft tissue mobilization to inferior and superior clavicle and coracoid process  · Supine right UE neural glides: median and ulnar nerves  · Supine bilateral iliopsoas release with FMP of lower trunk rotation  · Prone soft tissue mobilization around bony contours of iliac crest and sacral sulcus. · Prone rectus femoris manual stretches with contract/relax techniques  · Prone hip on axis right LE with manual resistance into hip ER/IR    (Used abbreviations: MET - muscle energy technique; PNF - proprioceptive neuromuscular facilitation; NMR - neuromuscular re-education; a/p - anterior to posterior; p/a - posterior to anterior, FMP - functional movement patterns)    Treatment/Session Assessment:    · Response to Treatment:  Improved mobility noted in cervical and lumbar spine, demonstrates independence with his HEP at this time. · Compliance with Program/Exercises: compliant all of the time.     Total Treatment Duration:  PT Patient Time In/Time Out  Time In: 0930  Time Out: [id number] [address] Chio Carter PT

## 2017-08-17 ENCOUNTER — APPOINTMENT (OUTPATIENT)
Dept: PHYSICAL THERAPY | Age: 47
End: 2017-08-17

## 2017-08-28 ENCOUNTER — HOSPITAL ENCOUNTER (OUTPATIENT)
Dept: PHYSICAL THERAPY | Age: 47
Discharge: HOME | End: 2017-08-28
Payer: COMMERCIAL

## 2017-08-28 PROCEDURE — 97162 PT EVAL MOD COMPLEX 30 MIN: CPT

## 2017-08-29 NOTE — PROGRESS NOTES
Jenni Benavides  : 1970 [facility] at [address]  [phone]   [phone]        OUTPATIENT PHYSICAL THERAPY:Initial Assessment 2017    ICD-10: Treatment Diagnosis: low back pain M54.5  ICD-10: Treatment Diagnosis: cervicalgia M54.2  ICD-10: Treatment Diagnosis: pain in joint, right shoulder M25.511  Precautions/Allergies:   Review of patient's allergies indicates not on file. Fall Risk Score: 2 (? 5 = High Risk)  MD Orders: self referred  MEDICAL/REFERRING DIAGNOSIS:  Low back pain [M54.5]   DATE OF ONSET: chronic  REFERRING PHYSICIAN: Referred, Self, MD  RETURN PHYSICIAN APPOINTMENT: as needed     INITIAL ASSESSMENT:  Mr. Shilo quintana is a 52 y.o. male who presents to physical therapy with chronic low back and neck pain. He continues to have right shoulder pain with certain movements. He is challenged with performing daily activities secondary to limited ROM and pain. He presents with cervical, thoracic and lumbar spine arthrokinematic dysfunctions, soft tissue restrictions, postural dysfunction, strength and endurance deficits. He would benefit from skilled physical therapy to improve overall function and mobility. PROBLEM LIST (Impacting functional limitations):  1. Decreased Strength  2. Decreased ADL/Functional Activities  3. Increased Pain  4. Decreased Activity Tolerance  5. Decreased Flexibility/Joint Mobility INTERVENTIONS PLANNED:  1. Home Exercise Program (HEP)  2. Manual Therapy  3. Neuromuscular Re-education/Strengthening  4. Range of Motion (ROM)  5. Therapeutic Activites  6. Therapeutic Exercise/Strengthening   TREATMENT PLAN:  Effective Dates: 17 TO 17. Frequency/Duration: 1 time a week for 4 weeks  GOALS: (Goals have been discussed and agreed upon with patient.)  Discharge Goals: Time Frame: 4 weeks  1.  Patient demonstrates neutral positions in sitting and standing through pelvis and torso without verbal cueing from therapist.  2. Patient improve core stability and postural stability strength to 4+/5 to perform ADLs  3. Patient able to ambulate for 30 minute without onset of pain. 4. Improve Oswestry outcome measure from 8/50 to 0/50. Rehabilitation Potential For Stated Goals: Excellent  Regarding Shaw Aldrich's therapy, I certify that the treatment plan above will be carried out by a therapist or under their direction. Thank you for this referral,  Tasha Lee, PT     Referring Physician Signature: Referred, Self, MD   n/a           Date                    The information in this section was collected on 8/28/17 (except where otherwise noted). HISTORY:   History of Present Injury/Illness (Reason for Referral):  Patient has a chronic cervical and lumbar spine pain and right shoulder pain. He has been able to work independently with improving his low back pain with exercises. He continues to have right anterior shoulder pain and challenged with shoulder movements secondary to pain. Increased discomfort also noted in cervical spine with left rotation and side bending. Patient denies dizziness, drop attacks, numbness/tingling, bowel/bladder dysfunction and/or unexplained weight gain/loss. Past Medical History/Comorbidities:   Mr. Faisal Moore  has no past medical history on file. Mr. Faisal Moore  has no past surgical history on file. Social History/Living Environment:     Lives in an apartment, no physical barriers noted  Prior Level of Function/Work/Activity:  Independent, however challenged with daily and recreational activities secondary to discomfort. Dominant Side:         RIGHT    Current Medications:     No current outpatient prescriptions on file.    Date Last Reviewed:  8/28/2017   Number of Personal Factors/Comorbidities that affect the Plan of Care: 0: LOW COMPLEXITY   EXAMINATION:   Observation/Orthostatic Postural Assessment:          Patient denies any increase of symptoms with coughing, sneezing or valsalva maneuver. Patient denies any headaches, changes in vision, dizziness, vertigo, nausea, drop attacks, black outs, tinnitus, dysphagia, dysarthria, LE symptoms or bowel/bladder dysfunction. Patient exhibits a decreased cervical lordosis and slight increased thoracic kyphosis. Patient exhibits a neutral lumbar lordosis. Shoulder symmetry exhibits right elevated compared to left. Shoulder girdles are bilateral protraction. Scapular position is right elevated and protracted. Clavicles are right elevated. Position of head is left cranial tilt. Lower quadrant bony landmarks are left iliac crest elevated, level greater trochanter. Vertical compression test (VCT) for alignment is 3. Elbow flexion test (EFT) for motor activation is 3. Soft tissue observations indicates restrictions in upper trapezius, levator scapula, pectoralis major/minor, latissimus dorsi, iliopsoas, hamstrings. .  Palpation:  Myofascial assessment indicates anterior chest restrictions. Muscle length testing levator scapulae with ipsilateral arm abduction is restricted right greater than left. Upper trapezius length is restricted right greater than left. Pectoralis minor/major and latissimus dorsi exhibit restricted bilaterally. Breathing assessment indicates decreased inhalation right. Ist rib palpation exhibits decreased inferior glide. Scalene muscle length is restrictions right greater than left. Restrictions noted in bilateral hamstrings, left hip flexors and bilateral piriformis. .     ROM: Gross active cervical spine rotation is 80% available on the R and 70% available of the L. Active cervical spine flexion is 5% restricted. Active cervical spine extension is 5% restricted. R side bending is 3 finger breaths ear to shoulder. L side bending is 3 finger breaths ear to shoulder. Upper cervical active nodding/tilting is restricted right. Upper cervical active rotation is restricted right.  Shoulder abduction with palm down is 90 right, 110 left. PROM C0/1 is sheared left. PROM C1/2 rotation is limited right rotation. Mid/lower cervical spine PROM is restricted right a/p C5-7. Strength: Functional strength testing through cervical spine 4/5. Postural stability strength 4/5. Core stability 4-/5  Special Tests:   ·  Sharp-Ta test is not tested  ·  cranial atlas lift is negative  ·  [place]-Axis shear test is negative  ·  [place]-Axis side flexion test for integrity of alar ligament is negative  ·  Tectorial membrane test is negative. ·  Spurling test is negative. ·  Cervical distraction is negative. ·  Cervical compression is negative. · Hautant's test is not tested. (Vertebral-Basilar Screen)  ·  Cranial extension test is not tested. (Vertebral-Basilar Screen)  Neurological Screen:  Myotomes: Key muscle strength testing for bilateral UE is intact. Dermatomes: Sensation testing through bilateral upper quadrants for light touch is intact. Reflexes: Biceps (C5), brachioradialis (C6), and triceps (C7) are 2+ and WFL. Neural tension tests: Upper limb tension test A is negative. Slump test is positive right. Functional Mobility:  Challenged with neutral posture sitting and standing positions. Body Structures Involved:  1. Nerves  2. Joints  3. Muscles Body Functions Affected:  1. Sensory/Pain  2. Neuromusculoskeletal  3. Movement Related Activities and Participation Affected:  1. General Tasks and Demands  2. Mobility  3. Community, Social and [place]   Number of elements (examined above) that affect the Plan of Care: 3: MODERATE COMPLEXITY   CLINICAL PRESENTATION:   Presentation: Evolving clinical presentation with changing clinical characteristics: MODERATE COMPLEXITY   CLINICAL DECISION MAKING:   Outcome Measure:    Tool Used: Modified Oswestry Low Back Pain Questionnaire  Score:  Initial: 8/50  Most Recent: X/50 (Date: -- )   Interpretation of Score: Each section is scored on a 0-5 scale, 5 representing the greatest disability. The scores of each section are added together for a total score of 50. Medical Necessity:   · Patient is expected to demonstrate progress in strength, range of motion, balance and coordination to increase independence with ADLs and recreational activities. Reason for Services/Other Comments:  · Patient continues to require skilled intervention due to challenged with chronic pain in cervical and lumbar spine. .   Use of outcome tool(s) and clinical judgement create a POC that gives a: Questionable prediction of patient's progress: MODERATE COMPLEXITY            TREATMENT:   (In addition to Assessment/Re-Assessment sessions the following treatments were rendered)  Pre-treatment Symptoms/Complaints:  Patient notes discomfort in cervical spine and right shoulder. Increased discomfort over the weekend secondary to sleeping positions. Pain: Initial: Pain Intensity 1: 5  Pain Location 1: Shoulder, Spine, cervical, Spine, thoracic, Spine, lumbar  Pain Orientation 1: Posterior, Right  Post Session:  4/10     Assessment only today, no treatment provided. Artist Growth  Treatment/Session Assessment:    · Response to Treatment:  Improved awareness of plan of care and prognosis of physical therapy treatments. · Compliance with Program/Exercises: compliant all of the time. · Recommendations/Intent for next treatment session: \"Next visit will focus on advancements to more challenging activities\".   Total Treatment Duration:  PT Patient Time In/Time Out  Time In: 1430  Time Out: [address] Philippe Summers PT

## 2017-08-29 NOTE — PROGRESS NOTES
Ambulatory/Rehab Services H2 Model Falls Risk Assessment    Risk Factor Pts. ·   Confusion/Disorientation/Impulsivity  []    4 ·   Symptomatic Depression  []   2 ·   Altered Elimination  []   1 ·   Dizziness/Vertigo  []   1 ·   Gender (Male)  [x]   1 ·   Any administered antiepileptics (anticonvulsants):  []   2 ·   Any administered benzodiazepines:  []   1 ·   Visual Impairment (specify):  []   1 ·   Portable Oxygen Use  []   1 ·   Orthostatic ? BP  []   1 ·   History of Recent Falls (within 3 mos.)  []   5     Ability to Rise from Chair (choose one) Pts. ·   Ability to rise in a single movement  []   0 ·   Pushes up, successful in one attempt  [x]   1 ·   Multiple attempts, but successful  []   3 ·   Unable to rise without assistance  []   4   Total: (5 or greater = High Risk) 2     Falls Prevention Plan:   []                Physical Limitations to Exercise (specify):   []                Mobility Assistance Device (type):   []                Exercise/Equipment Adaptation (specify):    ©2010 [facility] [address] [place] [address] Patent #[id number].  Federal Law prohibits the replication, distribution or use without written permission from [facility] [employer]

## 2017-09-06 ENCOUNTER — HOSPITAL ENCOUNTER (OUTPATIENT)
Dept: PHYSICAL THERAPY | Age: 47
Discharge: HOME | End: 2017-09-06
Payer: COMMERCIAL

## 2017-09-06 ENCOUNTER — HOSPITAL ENCOUNTER (OUTPATIENT)
Dept: PHYSICAL THERAPY | Age: 47
End: 2017-09-06

## 2017-09-06 NOTE — PROGRESS NOTES
Stephen Avina   (HLD:[id number]) Therapy Center at  [facility] [facility]  [phone]  [phone]             DATE: 9/6/2017    Patient no-showed for appointment today, he forgot what time his appointment was and rescheduled for tomorrow.      Dameon Palacio PT, DPT, CFMT

## 2017-09-07 ENCOUNTER — HOSPITAL ENCOUNTER (OUTPATIENT)
Dept: PHYSICAL THERAPY | Age: 47
Discharge: HOME | End: 2017-09-07
Payer: COMMERCIAL

## 2017-09-07 PROCEDURE — 97140 MANUAL THERAPY 1/> REGIONS: CPT

## 2017-09-07 PROCEDURE — 97110 THERAPEUTIC EXERCISES: CPT

## 2017-09-08 NOTE — PROGRESS NOTES
Marianne Barrett  : [facility] [address]  [phone]   [phone]        OUTPATIENT PHYSICAL THERAPY:Daily Note 2017    ICD-10: Treatment Diagnosis: low back pain M54.5  ICD-10: Treatment Diagnosis: cervicalgia M54.2  ICD-10: Treatment Diagnosis: pain in joint, right shoulder M25.511  Precautions/Allergies:   Review of patient's allergies indicates not on file. Fall Risk Score: 2 (? 5 = High Risk)  MD Orders: self referred  MEDICAL/REFERRING DIAGNOSIS:  Low back pain [M54.5]   DATE OF ONSET: chronic  REFERRING PHYSICIAN: Referred, Self, MD  RETURN PHYSICIAN APPOINTMENT: as needed     INITIAL ASSESSMENT:  Mr. Weston Shepherd is a 52 y.o. male who presents to physical therapy with chronic low back and neck pain. He continues to have right shoulder pain with certain movements. He is challenged with performing daily activities secondary to limited ROM and pain. He presents with cervical, thoracic and lumbar spine arthrokinematic dysfunctions, soft tissue restrictions, postural dysfunction, strength and endurance deficits. He would benefit from skilled physical therapy to improve overall function and mobility. PROBLEM LIST (Impacting functional limitations):  1. Decreased Strength  2. Decreased ADL/Functional Activities  3. Increased Pain  4. Decreased Activity Tolerance  5. Decreased Flexibility/Joint Mobility INTERVENTIONS PLANNED:  1. Home Exercise Program (HEP)  2. Manual Therapy  3. Neuromuscular Re-education/Strengthening  4. Range of Motion (ROM)  5. Therapeutic Activites  6. Therapeutic Exercise/Strengthening   TREATMENT PLAN:  Effective Dates: 17 TO 17. Frequency/Duration: 1 time a week for 4 weeks  GOALS: (Goals have been discussed and agreed upon with patient.)  Discharge Goals: Time Frame: 4 weeks  1.  Patient demonstrates neutral positions in sitting and standing through pelvis and torso without verbal cueing from therapist.  2. Patient improve core stability and postural stability strength to 4+/5 to perform ADLs  3. Patient able to ambulate for 30 minute without onset of pain. 4. Improve Oswestry outcome measure from 8/50 to 0/50. Rehabilitation Potential For Stated Goals: Excellent  Regarding Shaw Aldrich's therapy, I certify that the treatment plan above will be carried out by a therapist or under their direction. Thank you for this referral,  Tank Solis, PT     Referring Physician Signature: Referred, Self, MD   n/a           Date                    The information in this section was collected on 8/28/17 (except where otherwise noted). HISTORY:   History of Present Injury/Illness (Reason for Referral):  Patient has a chronic cervical and lumbar spine pain and right shoulder pain. He has been able to work independently with improving his low back pain with exercises. He continues to have right anterior shoulder pain and challenged with shoulder movements secondary to pain. Increased discomfort also noted in cervical spine with left rotation and side bending. Patient denies dizziness, drop attacks, numbness/tingling, bowel/bladder dysfunction and/or unexplained weight gain/loss. Past Medical History/Comorbidities:   Mr. Shilo quintana  has no past medical history on file. Mr. Shilo quintana  has no past surgical history on file. Social History/Living Environment:     Lives in an apartment, no physical barriers noted  Prior Level of Function/Work/Activity:  Independent, however challenged with daily and recreational activities secondary to discomfort. Dominant Side:         RIGHT    Current Medications:     No current outpatient prescriptions on file.    Date Last Reviewed:  9/7/2017   Number of Personal Factors/Comorbidities that affect the Plan of Care: 0: LOW COMPLEXITY   EXAMINATION:   Observation/Orthostatic Postural Assessment:          Patient denies any increase of symptoms with coughing, sneezing or valsalva maneuver. Patient denies any headaches, changes in vision, dizziness, vertigo, nausea, drop attacks, black outs, tinnitus, dysphagia, dysarthria, LE symptoms or bowel/bladder dysfunction. Patient exhibits a decreased cervical lordosis and slight increased thoracic kyphosis. Patient exhibits a neutral lumbar lordosis. Shoulder symmetry exhibits right elevated compared to left. Shoulder girdles are bilateral protraction. Scapular position is right elevated and protracted. Clavicles are right elevated. Position of head is left cranial tilt. Lower quadrant bony landmarks are left iliac crest elevated, level greater trochanter. Vertical compression test (VCT) for alignment is 3. Elbow flexion test (EFT) for motor activation is 3. Soft tissue observations indicates restrictions in upper trapezius, levator scapula, pectoralis major/minor, latissimus dorsi, iliopsoas, hamstrings. .  Palpation:  Myofascial assessment indicates anterior chest restrictions. Muscle length testing levator scapulae with ipsilateral arm abduction is restricted right greater than left. Upper trapezius length is restricted right greater than left. Pectoralis minor/major and latissimus dorsi exhibit restricted bilaterally. Breathing assessment indicates decreased inhalation right. Ist rib palpation exhibits decreased inferior glide. Scalene muscle length is restrictions right greater than left. Restrictions noted in bilateral hamstrings, left hip flexors and bilateral piriformis. .     ROM: Gross active cervical spine rotation is 80% available on the R and 70% available of the L. Active cervical spine flexion is 5% restricted. Active cervical spine extension is 5% restricted. R side bending is 3 finger breaths ear to shoulder. L side bending is 3 finger breaths ear to shoulder. Upper cervical active nodding/tilting is restricted right. Upper cervical active rotation is restricted right.  Shoulder abduction with palm down is 90 right, 110 left. PROM C0/1 is sheared left. PROM C1/2 rotation is limited right rotation. Mid/lower cervical spine PROM is restricted right a/p C5-7. Strength: Functional strength testing through cervical spine 4/5. Postural stability strength 4/5. Core stability 4-/5  Special Tests:   ·  Sharp-Ta test is not tested  ·  cranial atlas lift is negative  ·  [place]-Axis shear test is negative  ·  [place]-Axis side flexion test for integrity of alar ligament is negative  ·  Tectorial membrane test is negative. ·  Spurling test is negative. ·  Cervical distraction is negative. ·  Cervical compression is negative. · Hautant's test is not tested. (Vertebral-Basilar Screen)  ·  Cranial extension test is not tested. (Vertebral-Basilar Screen)  Neurological Screen:  Myotomes: Key muscle strength testing for bilateral UE is intact. Dermatomes: Sensation testing through bilateral upper quadrants for light touch is intact. Reflexes: Biceps (C5), brachioradialis (C6), and triceps (C7) are 2+ and WFL. Neural tension tests: Upper limb tension test A is negative. Slump test is positive right. Functional Mobility:  Challenged with neutral posture sitting and standing positions. Body Structures Involved:  1. Nerves  2. Joints  3. Muscles Body Functions Affected:  1. Sensory/Pain  2. Neuromusculoskeletal  3. Movement Related Activities and Participation Affected:  1. General Tasks and Demands  2. Mobility  3. Community, Social and [place]   Number of elements (examined above) that affect the Plan of Care: 3: MODERATE COMPLEXITY   CLINICAL PRESENTATION:   Presentation: Evolving clinical presentation with changing clinical characteristics: MODERATE COMPLEXITY   CLINICAL DECISION MAKING:   Outcome Measure: Tool Used: Modified Oswestry Low Back Pain Questionnaire  Score:  Initial: 8/50  Most Recent: X/50 (Date: -- )   Interpretation of Score: Each section is scored on a 0-5 scale, 5 representing the greatest disability.   The scores of each section are added together for a total score of 50. Medical Necessity:   · Patient is expected to demonstrate progress in strength, range of motion, balance and coordination to increase independence with ADLs and recreational activities. Reason for Services/Other Comments:  · Patient continues to require skilled intervention due to challenged with chronic pain in cervical and lumbar spine. .   Use of outcome tool(s) and clinical judgement create a POC that gives a: Questionable prediction of patient's progress: MODERATE COMPLEXITY            TREATMENT:   (In addition to Assessment/Re-Assessment sessions the following treatments were rendered)  Pre-treatment Symptoms/Complaints:  Patient notes continues to have pinching feeling into right anterior shoulder with daily activities, challenged with sitting at computer desk. Pain: Initial: Pain Intensity 1: 4  Pain Location 1: Shoulder  Pain Orientation 1: Right  Post Session:  3/10     Therapeutic Exercise: (15 Minutes):  Exercises per grid below to improve mobility, strength, balance and coordination. Required minimal verbal and manual cues to promote proper body alignment, promote proper body posture, promote proper body mechanics and promote proper body breathing techniques. Progressed resistance, range, repetitions and complexity of movement as indicated. Date:  9/7/2017   Activity/Exercise Parameters   Sitting techniques X 10 minute review, desk chair set up, pelvic positions, hip hinge, weight shift and weight acceptance. Upper trapezius stretch X 5 reps 15 sec holds   Axial elongation X 10 reps, 10 sec holds                     Manual Therapy (    Soft Tissue Mobilization Duration  Duration: 45 Minutes): Manual techniques to facilitate improved motion and decreased pain.   · Supine bilateral, right greater than left upper trapezius and levator scapula soft tissue mobilization with FMP of cervical spine rotation  · Supine right 1st rib inferior glide with breathing techniques  · Supine right anterior shoulder myofascial release, pectoralis major/minor soft tissue mobilization  · Supine right subscapularis and latissimus dorsi soft tissue mobilization with FMP of shoulder elevation  · With written consent received and precautions reviewed, instrument-assisted soft tissue mobilization was performed to right upper trapezius for the purpose of trigger point release with a positive treatment effect and no complications noted. (Used abbreviations: MET - muscle energy technique; PNF - proprioceptive neuromuscular facilitation; NMR - neuromuscular re-education; a/p - anterior to posterior; p/a - posterior to anterior, FMP - functional movement patterns)    [facility] Portal  Treatment/Session Assessment:    · Response to Treatment:  Improved position awareness of sitting posture, decreased soft tissue restrictions noted in right upper trapezius  · Compliance with Program/Exercises: compliant all of the time. · Recommendations/Intent for next treatment session: \"Next visit will focus on advancements to more challenging activities\".   Total Treatment Duration:  PT Patient Time In/Time Out  Time In: 1135  Time Out: [id number] [facility] Ever Fritz PT

## 2017-09-11 ENCOUNTER — APPOINTMENT (OUTPATIENT)
Dept: PHYSICAL THERAPY | Age: 47
End: 2017-09-11
Payer: COMMERCIAL

## 2017-09-14 ENCOUNTER — HOSPITAL ENCOUNTER (OUTPATIENT)
Dept: PHYSICAL THERAPY | Age: 47
Discharge: HOME | End: 2017-09-14
Payer: COMMERCIAL

## 2017-09-14 PROCEDURE — 97110 THERAPEUTIC EXERCISES: CPT

## 2017-09-14 PROCEDURE — 97140 MANUAL THERAPY 1/> REGIONS: CPT

## 2017-09-14 NOTE — PROGRESS NOTES
Rogelio Lyndsey  : [facility] at [address] [place] [address]  [phone]   [phone]        OUTPATIENT PHYSICAL THERAPY:Daily Note 2017    ICD-10: Treatment Diagnosis: low back pain M54.5  ICD-10: Treatment Diagnosis: cervicalgia M54.2  ICD-10: Treatment Diagnosis: pain in joint, right shoulder M25.511  Precautions/Allergies:   Review of patient's allergies indicates not on file. Fall Risk Score: 2 (? 5 = High Risk)  MD Orders: self referred  MEDICAL/REFERRING DIAGNOSIS:  Low back pain [M54.5]   DATE OF ONSET: chronic  REFERRING PHYSICIAN: Referred, Self, MD  RETURN PHYSICIAN APPOINTMENT: as needed     INITIAL ASSESSMENT:  Mr. Shilo quintana is a 52 y.o. male who presents to physical therapy with chronic low back and neck pain. He continues to have right shoulder pain with certain movements. He is challenged with performing daily activities secondary to limited ROM and pain. He presents with cervical, thoracic and lumbar spine arthrokinematic dysfunctions, soft tissue restrictions, postural dysfunction, strength and endurance deficits. He would benefit from skilled physical therapy to improve overall function and mobility. PROBLEM LIST (Impacting functional limitations):  1. Decreased Strength  2. Decreased ADL/Functional Activities  3. Increased Pain  4. Decreased Activity Tolerance  5. Decreased Flexibility/Joint Mobility INTERVENTIONS PLANNED:  1. Home Exercise Program (HEP)  2. Manual Therapy  3. Neuromuscular Re-education/Strengthening  4. Range of Motion (ROM)  5. Therapeutic Activites  6. Therapeutic Exercise/Strengthening   TREATMENT PLAN:  Effective Dates: 17 TO 17. Frequency/Duration: 1 time a week for 4 weeks  GOALS: (Goals have been discussed and agreed upon with patient.)  Discharge Goals: Time Frame: 4 weeks  1.  Patient demonstrates neutral positions in sitting and standing through pelvis and torso without verbal cueing from therapist.  2. Patient improve core stability and postural stability strength to 4+/5 to perform ADLs  3. Patient able to ambulate for 30 minute without onset of pain. 4. Improve Oswestry outcome measure from 8/50 to 0/50. Rehabilitation Potential For Stated Goals: Excellent  Regarding Shaw Aldrich's therapy, I certify that the treatment plan above will be carried out by a therapist or under their direction. Thank you for this referral,  Cole Linares PT     Referring Physician Signature: Referred, Self, MD   n/a           Date                    The information in this section was collected on 8/28/17 (except where otherwise noted). HISTORY:   History of Present Injury/Illness (Reason for Referral):  Patient has a chronic cervical and lumbar spine pain and right shoulder pain. He has been able to work independently with improving his low back pain with exercises. He continues to have right anterior shoulder pain and challenged with shoulder movements secondary to pain. Increased discomfort also noted in cervical spine with left rotation and side bending. Patient denies dizziness, drop attacks, numbness/tingling, bowel/bladder dysfunction and/or unexplained weight gain/loss. Past Medical History/Comorbidities:   Mr. Shilo quintana  has no past medical history on file. Mr. Shilo quintana  has no past surgical history on file. Social History/Living Environment:     Lives in an apartment, no physical barriers noted  Prior Level of Function/Work/Activity:  Independent, however challenged with daily and recreational activities secondary to discomfort. Dominant Side:         RIGHT    Current Medications:     No current outpatient prescriptions on file.    Date Last Reviewed:  9/14/2017   Number of Personal Factors/Comorbidities that affect the Plan of Care: 0: LOW COMPLEXITY   EXAMINATION:   Observation/Orthostatic Postural Assessment:          Patient denies any increase of symptoms with coughing, sneezing or valsalva maneuver. Patient denies any headaches, changes in vision, dizziness, vertigo, nausea, drop attacks, black outs, tinnitus, dysphagia, dysarthria, LE symptoms or bowel/bladder dysfunction. Patient exhibits a decreased cervical lordosis and slight increased thoracic kyphosis. Patient exhibits a neutral lumbar lordosis. Shoulder symmetry exhibits right elevated compared to left. Shoulder girdles are bilateral protraction. Scapular position is right elevated and protracted. Clavicles are right elevated. Position of head is left cranial tilt. Lower quadrant bony landmarks are left iliac crest elevated, level greater trochanter. Vertical compression test (VCT) for alignment is 3. Elbow flexion test (EFT) for motor activation is 3. Soft tissue observations indicates restrictions in upper trapezius, levator scapula, pectoralis major/minor, latissimus dorsi, iliopsoas, hamstrings. .  Palpation:  Myofascial assessment indicates anterior chest restrictions. Muscle length testing levator scapulae with ipsilateral arm abduction is restricted right greater than left. Upper trapezius length is restricted right greater than left. Pectoralis minor/major and latissimus dorsi exhibit restricted bilaterally. Breathing assessment indicates decreased inhalation right. Ist rib palpation exhibits decreased inferior glide. Scalene muscle length is restrictions right greater than left. Restrictions noted in bilateral hamstrings, left hip flexors and bilateral piriformis. .     ROM: Gross active cervical spine rotation is 80% available on the R and 70% available of the L. Active cervical spine flexion is 5% restricted. Active cervical spine extension is 5% restricted. R side bending is 3 finger breaths ear to shoulder. L side bending is 3 finger breaths ear to shoulder. Upper cervical active nodding/tilting is restricted right. Upper cervical active rotation is restricted right.  Shoulder abduction with palm down is 90 right, 110 left. PROM C0/1 is sheared left. PROM C1/2 rotation is limited right rotation. Mid/lower cervical spine PROM is restricted right a/p C5-7. Strength: Functional strength testing through cervical spine 4/5. Postural stability strength 4/5. Core stability 4-/5  Special Tests:   ·  Sharp-Ta test is not tested  ·  cranial atlas lift is negative  ·  [place]-Axis shear test is negative  ·  [place]-Axis side flexion test for integrity of alar ligament is negative  ·  Tectorial membrane test is negative. ·  Spurling test is negative. ·  Cervical distraction is negative. ·  Cervical compression is negative. · Hautant's test is not tested. (Vertebral-Basilar Screen)  ·  Cranial extension test is not tested. (Vertebral-Basilar Screen)  Neurological Screen:  Myotomes: Key muscle strength testing for bilateral UE is intact. Dermatomes: Sensation testing through bilateral upper quadrants for light touch is intact. Reflexes: Biceps (C5), brachioradialis (C6), and triceps (C7) are 2+ and WFL. Neural tension tests: Upper limb tension test A is negative. Slump test is positive right. Functional Mobility:  Challenged with neutral posture sitting and standing positions. Body Structures Involved:  1. Nerves  2. Joints  3. Muscles Body Functions Affected:  1. Sensory/Pain  2. Neuromusculoskeletal  3. Movement Related Activities and Participation Affected:  1. General Tasks and Demands  2. Mobility  3. Community, Social and [place]   Number of elements (examined above) that affect the Plan of Care: 3: MODERATE COMPLEXITY   CLINICAL PRESENTATION:   Presentation: Evolving clinical presentation with changing clinical characteristics: MODERATE COMPLEXITY   CLINICAL DECISION MAKING:   Outcome Measure:    Tool Used: Modified Oswestry Low Back Pain Questionnaire  Score:  Initial: 8/50  Most Recent: X/50 (Date: -- )   Interpretation of Score: Each section is scored on a 0-5 scale, 5 representing the greatest disability. The scores of each section are added together for a total score of 50. Medical Necessity:   · Patient is expected to demonstrate progress in strength, range of motion, balance and coordination to increase independence with ADLs and recreational activities. Reason for Services/Other Comments:  · Patient continues to require skilled intervention due to challenged with chronic pain in cervical and lumbar spine. .   Use of outcome tool(s) and clinical judgement create a POC that gives a: Questionable prediction of patient's progress: MODERATE COMPLEXITY            TREATMENT:   (In addition to Assessment/Re-Assessment sessions the following treatments were rendered)  Pre-treatment Symptoms/Complaints:  Patient notes a little improvement in right shoulder mobility, however continues to have tightness in right upper trapezius and anterior shoulder pain. He notes sleeping in bad positions lately. Pain: Initial: Pain Intensity 1: 4  Pain Location 1: Shoulder  Pain Orientation 1: Right  Post Session:  3/10     Therapeutic Exercise: (10 Minutes):  Exercises per grid below to improve mobility, strength, balance and coordination. Required minimal verbal and manual cues to promote proper body alignment, promote proper body posture, promote proper body mechanics and promote proper body breathing techniques. Progressed resistance, range, repetitions and complexity of movement as indicated. Date:  9/14/2017   Activity/Exercise Parameters   Sitting techniques . Upper trapezius stretch X 5 reps 15 sec holds   Axial elongation X 10 reps, 10 sec holds   Posterior scapular stabilizers Review of rows, lat pulldowns, low rows, 1/2 prone prop scapular stabilization   Sleeping positions X 5 minute review in side lying position with correct pillow placement.               Manual Therapy (    Soft Tissue Mobilization Duration  Duration: 50 Minutes): Manual techniques to facilitate improved motion and decreased pain.  · Supine bilateral, right greater than left upper trapezius and levator scapula soft tissue mobilization with FMP of cervical spine rotation  · Supine right 1st rib inferior glide with breathing techniques  · Supine right anterior shoulder myofascial release, pectoralis major/minor soft tissue mobilization  · Supine right subscapularis and latissimus dorsi soft tissue mobilization with FMP of shoulder elevation  · With written consent received and precautions reviewed, instrument-assisted soft tissue mobilization was performed to right upper trapezius for the purpose of trigger point release with a positive treatment effect and no complications noted. · Side lying left for right AC joint inferior glides with manual resistance as patient actively shrugs, followed with NMR.    (Used abbreviations: MET - muscle energy technique; PNF - proprioceptive neuromuscular facilitation; NMR - neuromuscular re-education; a/p - anterior to posterior; p/a - posterior to anterior, FMP - functional movement patterns)    [facility] Portal  Treatment/Session Assessment:    · Response to Treatment:  Improved AC mobility of right shoulder, decreased restrictions noted in right upper trapezius. · Compliance with Program/Exercises: compliant all of the time. · Recommendations/Intent for next treatment session: \"Next visit will focus on advancements to more challenging activities\".   Total Treatment Duration:  PT Patient Time In/Time Out  Time In: 0830  Time Out: [facility] Valentina Courtney PT

## 2017-09-19 ENCOUNTER — HOSPITAL ENCOUNTER (OUTPATIENT)
Dept: PHYSICAL THERAPY | Age: 47
Discharge: HOME | End: 2017-09-19
Payer: COMMERCIAL

## 2017-09-19 PROCEDURE — 97140 MANUAL THERAPY 1/> REGIONS: CPT

## 2017-09-19 PROCEDURE — 97110 THERAPEUTIC EXERCISES: CPT

## 2017-09-19 NOTE — PROGRESS NOTES
Maciel Eason  : [facility] at [address] [place] [address]  [phone]   [phone]        OUTPATIENT PHYSICAL THERAPY:Daily Note 2017    ICD-10: Treatment Diagnosis: low back pain M54.5  ICD-10: Treatment Diagnosis: cervicalgia M54.2  ICD-10: Treatment Diagnosis: pain in joint, right shoulder M25.511  Precautions/Allergies:   Review of patient's allergies indicates not on file. Fall Risk Score: 2 (? 5 = High Risk)  MD Orders: self referred  MEDICAL/REFERRING DIAGNOSIS:  Low back pain [M54.5]   DATE OF ONSET: chronic  REFERRING PHYSICIAN: Referred, Self, MD  RETURN PHYSICIAN APPOINTMENT: as needed     INITIAL ASSESSMENT:  Mr. Shilo quintana is a 52 y.o. male who presents to physical therapy with chronic low back and neck pain. He continues to have right shoulder pain with certain movements. He is challenged with performing daily activities secondary to limited ROM and pain. He presents with cervical, thoracic and lumbar spine arthrokinematic dysfunctions, soft tissue restrictions, postural dysfunction, strength and endurance deficits. He would benefit from skilled physical therapy to improve overall function and mobility. PROBLEM LIST (Impacting functional limitations):  1. Decreased Strength  2. Decreased ADL/Functional Activities  3. Increased Pain  4. Decreased Activity Tolerance  5. Decreased Flexibility/Joint Mobility INTERVENTIONS PLANNED:  1. Home Exercise Program (HEP)  2. Manual Therapy  3. Neuromuscular Re-education/Strengthening  4. Range of Motion (ROM)  5. Therapeutic Activites  6. Therapeutic Exercise/Strengthening   TREATMENT PLAN:  Effective Dates: 17 TO 17. Frequency/Duration: 1 time a week for 4 weeks  GOALS: (Goals have been discussed and agreed upon with patient.)  Discharge Goals: Time Frame: 4 weeks  1.  Patient demonstrates neutral positions in sitting and standing through pelvis and torso without verbal cueing from therapist.  2. Patient improve core stability and postural stability strength to 4+/5 to perform ADLs  3. Patient able to ambulate for 30 minute without onset of pain. 4. Improve Oswestry outcome measure from 8/50 to 0/50. Rehabilitation Potential For Stated Goals: Excellent  Regarding Shaw Aldrich's therapy, I certify that the treatment plan above will be carried out by a therapist or under their direction. Thank you for this referral,  Wendy Linares PT     Referring Physician Signature: Referred, Self, MD   n/a           Date                    The information in this section was collected on 8/28/17 (except where otherwise noted). HISTORY:   History of Present Injury/Illness (Reason for Referral):  Patient has a chronic cervical and lumbar spine pain and right shoulder pain. He has been able to work independently with improving his low back pain with exercises. He continues to have right anterior shoulder pain and challenged with shoulder movements secondary to pain. Increased discomfort also noted in cervical spine with left rotation and side bending. Patient denies dizziness, drop attacks, numbness/tingling, bowel/bladder dysfunction and/or unexplained weight gain/loss. Past Medical History/Comorbidities:   Mr. Shilo quintana  has no past medical history on file. Mr. Shilo quintana  has no past surgical history on file. Social History/Living Environment:     Lives in an apartment, no physical barriers noted  Prior Level of Function/Work/Activity:  Independent, however challenged with daily and recreational activities secondary to discomfort. Dominant Side:         RIGHT    Current Medications:     No current outpatient prescriptions on file.    Date Last Reviewed:  9/19/2017   Number of Personal Factors/Comorbidities that affect the Plan of Care: 0: LOW COMPLEXITY   EXAMINATION:   Observation/Orthostatic Postural Assessment:          Patient denies any increase of symptoms with coughing, sneezing or valsalva maneuver. Patient denies any headaches, changes in vision, dizziness, vertigo, nausea, drop attacks, black outs, tinnitus, dysphagia, dysarthria, LE symptoms or bowel/bladder dysfunction. Patient exhibits a decreased cervical lordosis and slight increased thoracic kyphosis. Patient exhibits a neutral lumbar lordosis. Shoulder symmetry exhibits right elevated compared to left. Shoulder girdles are bilateral protraction. Scapular position is right elevated and protracted. Clavicles are right elevated. Position of head is left cranial tilt. Lower quadrant bony landmarks are left iliac crest elevated, level greater trochanter. Vertical compression test (VCT) for alignment is 3. Elbow flexion test (EFT) for motor activation is 3. Soft tissue observations indicates restrictions in upper trapezius, levator scapula, pectoralis major/minor, latissimus dorsi, iliopsoas, hamstrings. .  Palpation:  Myofascial assessment indicates anterior chest restrictions. Muscle length testing levator scapulae with ipsilateral arm abduction is restricted right greater than left. Upper trapezius length is restricted right greater than left. Pectoralis minor/major and latissimus dorsi exhibit restricted bilaterally. Breathing assessment indicates decreased inhalation right. Ist rib palpation exhibits decreased inferior glide. Scalene muscle length is restrictions right greater than left. Restrictions noted in bilateral hamstrings, left hip flexors and bilateral piriformis. .     ROM: Gross active cervical spine rotation is 80% available on the R and 70% available of the L. Active cervical spine flexion is 5% restricted. Active cervical spine extension is 5% restricted. R side bending is 3 finger breaths ear to shoulder. L side bending is 3 finger breaths ear to shoulder. Upper cervical active nodding/tilting is restricted right. Upper cervical active rotation is restricted right.  Shoulder abduction with palm down is 90 right, 110 left. PROM C0/1 is sheared left. PROM C1/2 rotation is limited right rotation. Mid/lower cervical spine PROM is restricted right a/p C5-7. Strength: Functional strength testing through cervical spine 4/5. Postural stability strength 4/5. Core stability 4-/5  Special Tests:   ·  Sharp-Ta test is not tested  ·  cranial atlas lift is negative  ·  [place]-Axis shear test is negative  ·  [place]-Axis side flexion test for integrity of alar ligament is negative  ·  Tectorial membrane test is negative. ·  Spurling test is negative. ·  Cervical distraction is negative. ·  Cervical compression is negative. · Hautant's test is not tested. (Vertebral-Basilar Screen)  ·  Cranial extension test is not tested. (Vertebral-Basilar Screen)  Neurological Screen:  Myotomes: Key muscle strength testing for bilateral UE is intact. Dermatomes: Sensation testing through bilateral upper quadrants for light touch is intact. Reflexes: Biceps (C5), brachioradialis (C6), and triceps (C7) are 2+ and WFL. Neural tension tests: Upper limb tension test A is negative. Slump test is positive right. Functional Mobility:  Challenged with neutral posture sitting and standing positions. Body Structures Involved:  1. Nerves  2. Joints  3. Muscles Body Functions Affected:  1. Sensory/Pain  2. Neuromusculoskeletal  3. Movement Related Activities and Participation Affected:  1. General Tasks and Demands  2. Mobility  3. Community, Social and [place]   Number of elements (examined above) that affect the Plan of Care: 3: MODERATE COMPLEXITY   CLINICAL PRESENTATION:   Presentation: Evolving clinical presentation with changing clinical characteristics: MODERATE COMPLEXITY   CLINICAL DECISION MAKING:   Outcome Measure:    Tool Used: Modified Oswestry Low Back Pain Questionnaire  Score:  Initial: 8/50  Most Recent: X/50 (Date: -- )   Interpretation of Score: Each section is scored on a 0-5 scale, 5 representing the greatest disability. The scores of each section are added together for a total score of 50. Medical Necessity:   · Patient is expected to demonstrate progress in strength, range of motion, balance and coordination to increase independence with ADLs and recreational activities. Reason for Services/Other Comments:  · Patient continues to require skilled intervention due to challenged with chronic pain in cervical and lumbar spine. .   Use of outcome tool(s) and clinical judgement create a POC that gives a: Questionable prediction of patient's progress: MODERATE COMPLEXITY            TREATMENT:   (In addition to Assessment/Re-Assessment sessions the following treatments were rendered)  Pre-treatment Symptoms/Complaints:  Patient notes increased right anterior shoulder and shoulder blade pain after yesterday's workout. Noted he did some chest exercises. Pain: Initial: Pain Intensity 1: 4  Pain Location 1: Shoulder  Pain Orientation 1: Right  Post Session:  3/10     Therapeutic Exercise: (10 Minutes):  Exercises per grid below to improve mobility, strength, balance and coordination. Required minimal verbal and manual cues to promote proper body alignment, promote proper body posture, promote proper body mechanics and promote proper body breathing techniques. Progressed resistance, range, repetitions and complexity of movement as indicated. Date:  9/19/2017   Activity/Exercise Parameters   Sitting techniques . Upper trapezius stretch X 5 reps 15 sec holds   Axial elongation X 10 reps, 10 sec holds   Posterior scapular stabilizers X 10 reps, 5 sec holds of each rows, lat pulldowns, low rows, 1/2 prone prop scapular stabilization   Review HEP X 5 minutes             Manual Therapy (    Soft Tissue Mobilization Duration  Duration: 35 Minutes): Manual techniques to facilitate improved motion and decreased pain.   · Supine bilateral, right greater than left upper trapezius and levator scapula soft tissue mobilization with FMP of cervical spine rotation  · Supine right 1st rib inferior glide with breathing techniques  · Supine right anterior shoulder myofascial release, pectoralis major/minor soft tissue mobilization  · Supine right subscapularis and latissimus dorsi soft tissue mobilization with FMP of shoulder elevation  · With written consent received and precautions reviewed, instrument-assisted soft tissue mobilization was performed to right upper trapezius, levator scapula and infraspinatus for the purpose of trigger point release with a positive treatment effect and no complications noted. · Side lying left for right AC joint inferior glides with manual resistance as patient actively shrugs, followed with NMR.    (Used abbreviations: MET - muscle energy technique; PNF - proprioceptive neuromuscular facilitation; NMR - neuromuscular re-education; a/p - anterior to posterior; p/a - posterior to anterior, FMP - functional movement patterns)    [facility] Portal  Treatment/Session Assessment:    · Response to Treatment:  Decreased soft tissue restrictions noted in right shoulder girdle, improved awareness of muscle imbalances and focus to improve postural stabilizer strength. · Compliance with Program/Exercises: compliant all of the time. · Recommendations/Intent for next treatment session: \"Next visit will focus on advancements to more challenging activities\".   Total Treatment Duration:  PT Patient Time In/Time Out  Time In: 0663  Time Out: [address] [facility] Josesito Carvajal, PT

## 2017-09-25 ENCOUNTER — HOSPITAL ENCOUNTER (OUTPATIENT)
Dept: PHYSICAL THERAPY | Age: 47
Discharge: HOME | End: 2017-09-25
Payer: COMMERCIAL

## 2017-09-25 PROCEDURE — 97110 THERAPEUTIC EXERCISES: CPT

## 2017-09-25 PROCEDURE — 97140 MANUAL THERAPY 1/> REGIONS: CPT

## 2017-09-25 NOTE — PROGRESS NOTES
Jenni Benavides  : 1970 [facility] [address]  [phone]   [phone]        OUTPATIENT PHYSICAL THERAPY:Daily Note 2017    ICD-10: Treatment Diagnosis: low back pain M54.5  ICD-10: Treatment Diagnosis: cervicalgia M54.2  ICD-10: Treatment Diagnosis: pain in joint, right shoulder M25.511  Precautions/Allergies:   Review of patient's allergies indicates not on file. Fall Risk Score: 2 (? 5 = High Risk)  MD Orders: self referred  MEDICAL/REFERRING DIAGNOSIS:  Low back pain [M54.5]   DATE OF ONSET: chronic  REFERRING PHYSICIAN: Referred, Self, MD  RETURN PHYSICIAN APPOINTMENT: as needed     INITIAL ASSESSMENT:  Mr. Kaylee Camilo is a 52 y.o. male who presents to physical therapy with chronic low back and neck pain. He continues to have right shoulder pain with certain movements. He is challenged with performing daily activities secondary to limited ROM and pain. He presents with cervical, thoracic and lumbar spine arthrokinematic dysfunctions, soft tissue restrictions, postural dysfunction, strength and endurance deficits. He would benefit from skilled physical therapy to improve overall function and mobility. PROBLEM LIST (Impacting functional limitations):  1. Decreased Strength  2. Decreased ADL/Functional Activities  3. Increased Pain  4. Decreased Activity Tolerance  5. Decreased Flexibility/Joint Mobility INTERVENTIONS PLANNED:  1. Home Exercise Program (HEP)  2. Manual Therapy  3. Neuromuscular Re-education/Strengthening  4. Range of Motion (ROM)  5. Therapeutic Activites  6. Therapeutic Exercise/Strengthening   TREATMENT PLAN:  Effective Dates: 17 TO 17. Frequency/Duration: 1 time a week for 4 weeks  GOALS: (Goals have been discussed and agreed upon with patient.)  Discharge Goals: Time Frame: 4 weeks  1.  Patient demonstrates neutral positions in sitting and standing through pelvis and torso without verbal cueing from therapist.  2. Patient improve core stability and postural stability strength to 4+/5 to perform ADLs  3. Patient able to ambulate for 30 minute without onset of pain. 4. Improve Oswestry outcome measure from 8/50 to 0/50. Rehabilitation Potential For Stated Goals: Excellent  Regarding Shaw Aldrich's therapy, I certify that the treatment plan above will be carried out by a therapist or under their direction. Thank you for this referral,  Jammie Thao PT     Referring Physician Signature: Referred, Self, MD   n/a           Date                    The information in this section was collected on 8/28/17 (except where otherwise noted). HISTORY:   History of Present Injury/Illness (Reason for Referral):  Patient has a chronic cervical and lumbar spine pain and right shoulder pain. He has been able to work independently with improving his low back pain with exercises. He continues to have right anterior shoulder pain and challenged with shoulder movements secondary to pain. Increased discomfort also noted in cervical spine with left rotation and side bending. Patient denies dizziness, drop attacks, numbness/tingling, bowel/bladder dysfunction and/or unexplained weight gain/loss. Past Medical History/Comorbidities:   Mr. Shilo quintana  has no past medical history on file. Mr. Shilo quintana  has no past surgical history on file. Social History/Living Environment:     Lives in an apartment, no physical barriers noted  Prior Level of Function/Work/Activity:  Independent, however challenged with daily and recreational activities secondary to discomfort. Dominant Side:         RIGHT    Current Medications:     No current outpatient prescriptions on file.    Date Last Reviewed:  9/25/2017   Number of Personal Factors/Comorbidities that affect the Plan of Care: 0: LOW COMPLEXITY   EXAMINATION:   Observation/Orthostatic Postural Assessment:          Patient denies any increase of symptoms with coughing, sneezing or valsalva maneuver. Patient denies any headaches, changes in vision, dizziness, vertigo, nausea, drop attacks, black outs, tinnitus, dysphagia, dysarthria, LE symptoms or bowel/bladder dysfunction. Patient exhibits a decreased cervical lordosis and slight increased thoracic kyphosis. Patient exhibits a neutral lumbar lordosis. Shoulder symmetry exhibits right elevated compared to left. Shoulder girdles are bilateral protraction. Scapular position is right elevated and protracted. Clavicles are right elevated. Position of head is left cranial tilt. Lower quadrant bony landmarks are left iliac crest elevated, level greater trochanter. Vertical compression test (VCT) for alignment is 3. Elbow flexion test (EFT) for motor activation is 3. Soft tissue observations indicates restrictions in upper trapezius, levator scapula, pectoralis major/minor, latissimus dorsi, iliopsoas, hamstrings. .  Palpation:  Myofascial assessment indicates anterior chest restrictions. Muscle length testing levator scapulae with ipsilateral arm abduction is restricted right greater than left. Upper trapezius length is restricted right greater than left. Pectoralis minor/major and latissimus dorsi exhibit restricted bilaterally. Breathing assessment indicates decreased inhalation right. Ist rib palpation exhibits decreased inferior glide. Scalene muscle length is restrictions right greater than left. Restrictions noted in bilateral hamstrings, left hip flexors and bilateral piriformis. .     ROM: Gross active cervical spine rotation is 80% available on the R and 70% available of the L. Active cervical spine flexion is 5% restricted. Active cervical spine extension is 5% restricted. R side bending is 3 finger breaths ear to shoulder. L side bending is 3 finger breaths ear to shoulder. Upper cervical active nodding/tilting is restricted right. Upper cervical active rotation is restricted right.  Shoulder abduction with palm down is 90 right, 110 left. PROM C0/1 is sheared left. PROM C1/2 rotation is limited right rotation. Mid/lower cervical spine PROM is restricted right a/p C5-7. Strength: Functional strength testing through cervical spine 4/5. Postural stability strength 4/5. Core stability 4-/5  Special Tests:   ·  Sharp-Ta test is not tested  ·  cranial atlas lift is negative  ·  [place]-Axis shear test is negative  ·  [place]-Axis side flexion test for integrity of alar ligament is negative  ·  Tectorial membrane test is negative. ·  Spurling test is negative. ·  Cervical distraction is negative. ·  Cervical compression is negative. · Hautant's test is not tested. (Vertebral-Basilar Screen)  ·  Cranial extension test is not tested. (Vertebral-Basilar Screen)  Neurological Screen:  Myotomes: Key muscle strength testing for bilateral UE is intact. Dermatomes: Sensation testing through bilateral upper quadrants for light touch is intact. Reflexes: Biceps (C5), brachioradialis (C6), and triceps (C7) are 2+ and WFL. Neural tension tests: Upper limb tension test A is negative. Slump test is positive right. Functional Mobility:  Challenged with neutral posture sitting and standing positions. Body Structures Involved:  1. Nerves  2. Joints  3. Muscles Body Functions Affected:  1. Sensory/Pain  2. Neuromusculoskeletal  3. Movement Related Activities and Participation Affected:  1. General Tasks and Demands  2. Mobility  3. Community, Social and [place]   Number of elements (examined above) that affect the Plan of Care: 3: MODERATE COMPLEXITY   CLINICAL PRESENTATION:   Presentation: Evolving clinical presentation with changing clinical characteristics: MODERATE COMPLEXITY   CLINICAL DECISION MAKING:   Outcome Measure:    Tool Used: Modified Oswestry Low Back Pain Questionnaire  Score:  Initial: 8/50  Most Recent: X/50 (Date: -- )   Interpretation of Score: Each section is scored on a 0-5 scale, 5 representing the greatest disability. The scores of each section are added together for a total score of 50. Medical Necessity:   · Patient is expected to demonstrate progress in strength, range of motion, balance and coordination to increase independence with ADLs and recreational activities. Reason for Services/Other Comments:  · Patient continues to require skilled intervention due to challenged with chronic pain in cervical and lumbar spine. .   Use of outcome tool(s) and clinical judgement create a POC that gives a: Questionable prediction of patient's progress: MODERATE COMPLEXITY            TREATMENT:   (In addition to Assessment/Re-Assessment sessions the following treatments were rendered)  Pre-treatment Symptoms/Complaints:  Patient notes continues to have right shoulder pain, especially sitting still and with crossing right arm in front of body. Pain: Initial: Pain Intensity 1: 3  Pain Location 1: Shoulder  Pain Orientation 1: Right  Post Session:  3/10     Therapeutic Exercise: (10 Minutes):  Exercises per grid below to improve mobility, strength, balance and coordination. Required minimal verbal and manual cues to promote proper body alignment, promote proper body posture, promote proper body mechanics and promote proper body breathing techniques. Progressed resistance, range, repetitions and complexity of movement as indicated. Date:  9/25/2017   Activity/Exercise Parameters   Sitting techniques . Upper trapezius stretch X 5 reps 15 sec holds   Axial elongation X 10 reps, 10 sec holds   Posterior scapular stabilizers X 10 reps, 5 sec holds of each rows, lat pulldowns, low rows, 1/2 prone prop scapular stabilization   Review HEP X 5 minutes             Manual Therapy (    Soft Tissue Mobilization Duration  Duration: 50 Minutes): Manual techniques to facilitate improved motion and decreased pain.   · Supine bilateral, right greater than left upper trapezius and levator scapula soft tissue mobilization with FMP of cervical spine rotation  · Supine right 1st rib inferior glide with breathing techniques  · Supine right anterior shoulder myofascial release, pectoralis major/minor soft tissue mobilization  · Supine right subscapularis and latissimus dorsi soft tissue mobilization with FMP of shoulder elevation  · With written consent received and precautions reviewed, instrument-assisted soft tissue mobilization was performed to right upper trapezius, levator scapula and infraspinatus for the purpose of trigger point release with a positive treatment effect and no complications noted. · Prone soft tissue mobilization of right upper trapezius, medial border of scapula and groove of thoracic spine. (Used abbreviations: MET - muscle energy technique; PNF - proprioceptive neuromuscular facilitation; NMR - neuromuscular re-education; a/p - anterior to posterior; p/a - posterior to anterior, FMP - functional movement patterns)    [facility] Portal  Treatment/Session Assessment:    · Response to Treatment:  Decreased restrictions in right shoulder and anterior chest, improved postural stability activation. · Compliance with Program/Exercises: compliant all of the time. · Recommendations/Intent for next treatment session: \"Next visit will focus on advancements to more challenging activities\".   Total Treatment Duration:  PT Patient Time In/Time Out  Time In: 0930  Time Out: [address]  Laura Conception, PT

## 2017-10-23 NOTE — PROGRESS NOTES
Dk Dias  : 1970 [facility] at [address]  [phone]   [phone]        OUTPATIENT PHYSICAL THERAPY:Discontinuation Summary 10/23/2017    ICD-10: Treatment Diagnosis: low back pain M54.5  ICD-10: Treatment Diagnosis: cervicalgia M54.2  ICD-10: Treatment Diagnosis: pain in joint, right shoulder M25.511  Precautions/Allergies:   Review of patient's allergies indicates not on file. Fall Risk Score: 2 (? 5 = High Risk)  MD Orders: self referred  MEDICAL/REFERRING DIAGNOSIS:  Low back pain [M54.5]   DATE OF ONSET: chronic  REFERRING PHYSICIAN: Referred, Self, MD  RETURN PHYSICIAN APPOINTMENT: as needed     INITIAL ASSESSMENT:  Mr. Calin Toribio is a 52 y.o. male who presents to physical therapy with chronic low back and neck pain. He continues to have right shoulder pain with certain movements. He is challenged with performing daily activities secondary to limited ROM and pain. He presents with cervical, thoracic and lumbar spine arthrokinematic dysfunctions, soft tissue restrictions, postural dysfunction, strength and endurance deficits. He would benefit from skilled physical therapy to improve overall function and mobility. 10/23/17: Dk Dias has been seen in physical therapy from 17 to 17 for 5 visits. Treatment has been discontinued at this time due to Expiration of plan of care without further referral by ordering physician and patient failing to return for additional treatment. The below goals were met prior to discontinuation. Some goals were not met due to continuing to address independently. .   Thank you for this referral.             TREATMENT PLAN:  Effective Dates: 17 TO 17. Frequency/Duration: 1 time a week for 4 weeks  GOALS: (Goals have been discussed and agreed upon with patient.)  Discharge Goals: Time Frame: 4 weeks  1.  Patient demonstrates neutral positions in sitting and standing through pelvis and torso without verbal cueing from therapist. GOAL MET  2. Patient improve core stability and postural stability strength to 4+/5 to perform ADLs ALMOST MET  3. Patient able to ambulate for 30 minute without onset of pain. - GOAL MET  4. Improve Oswestry outcome measure from 8/50 to 0/50.  NOT MET  Rehabilitation Potential For Stated Goals: Excellent

## 2019-02-13 ENCOUNTER — HOSPITAL ENCOUNTER (OUTPATIENT)
Dept: PHYSICAL THERAPY | Age: 49
Discharge: HOME | End: 2019-02-13
Payer: COMMERCIAL

## 2019-02-13 PROCEDURE — 97162 PT EVAL MOD COMPLEX 30 MIN: CPT

## 2019-02-13 NOTE — THERAPY EVALUATION
Mariana Green : 1970 Primary: [facility] Secondary:  [facility]rapy Center at [address] [phone]   [phone] OUTPATIENT PHYSICAL THERAPY:Initial Assessment and Daily Note 2019 ICD-10: Treatment Diagnosis: pain in joint, right shoulder M25.511 ICD-10: Treatment Diagnosis: thoracic spine pain M54.6 ICD-10: Treatment Diagnosis: cervicalgia M54.2 Precautions/Allergies: penicillin MD Orders: self-referred MEDICAL/REFERRING DIAGNOSIS: 
Pain in right shoulder [M25.511] Pain in right arm [M79.601] DATE OF ONSET: 2018 REFERRING PHYSICIAN: Referred, Self, MD 
RETURN PHYSICIAN APPOINTMENT: as needed INITIAL ASSESSMENT:  Mr. Renee Setvens is a 52 y.o. male who presents to physical therapy with right shoulder pain and cervical/thoracic spine pain. He demonstrates limited ROM in right shoulder girdle, soft tissue restrictions, cervical and thoracic spine arthrokinematic dysfunctions, strength deficits in postural stabilizers. He would benefit from skilled physical therapy to improve overall mobility in right shoulder, cervical/thoracic spine and improve overall strength and endurance. PROBLEM LIST (Impacting functional limitations): 1. Decreased Strength 2. Decreased ADL/Functional Activities 3. Increased Pain 4. Decreased Activity Tolerance 5. Decreased Flexibility/Joint Mobility INTERVENTIONS PLANNED: (Treatment may consist of any combination of the following) 1. Home Exercise Program (HEP) 2. Manual Therapy 3. Neuromuscular Re-education/Strengthening 4. Range of Motion (ROM) 5. Therapeutic Exercise/Strengthening TREATMENT PLAN: 
Effective Dates: 2019 TO 3/15/2019 (30 days). Frequency/Duration: 1 time a week for 30 Day(s) GOALS: (Goals have been discussed and agreed upon with patient.) Discharge Goals: Time Frame: 4 weeks 1. Patient demonstrates independence with HEP without verbal cueing from therapist. 
2. Patient demonstrates right shoulder AROM to 0-155 degrees flexion, 0-155 degrees abduction 3. Patient able to sleep for 4 consecutive hours with proper pillow and position to decrease strain to right shoulder. 4. Improve DASH outcome measure score from 32/55 to 15/55 to perform ADLs. Rehabilitation Potential For Stated Goals: Excellent Regarding Shaw Aldrich's therapy, I certify that the treatment plan above will be carried out by a therapist or under their direction. Lilia Carrel, PT The information in this section was collected on 2/13/19 (except where otherwise noted). HISTORY:  
History of Present Injury/Illness (Reason for Referral): Chronic history of right shoulder and cervical spine pain with increased pain in right pectoralis, right biceps and lateral arm since November 2018. He has noted some improvements with self stretches. He has increased amount of weight lifting over the past few months. He has also noted bilateral UE numbness, left greater than right around the elbow and into 5th digit. He notes challenges with sleeping, has started sleeping with his right shoulder under his neck and pillow. Has also noted tightness in left cervical spine. Increase stress in life and notes tension with bilateral upper trapezius. Patient denies dizziness, drop attacks, numbness/tingling with exception of BUE 5th digits, left greater than right, bowel/bladder dysfunction and/or unexplained weight gain/loss. Past Medical History/Comorbidities:  
Mr. Pravin Hall  has past medical history of anxiety, joint pain and numbness in LUE. Mr. Pravin Hall  has no past surgical history Social History/Living Environment:  
  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 
Independent with activities, however challenged with use of computer/desk sitting secondary to pain in cervical/thoracic spine and right shoulder. Dominant Side:  
      RIGHT Ambulatory/Rehab Services H2 Model Falls Risk Assessment Risk Factors: 
     (1)  Gender [Male] Ability to Rise from Chair: 
     (0)  Ability to rise in a single movement Falls Prevention Plan: No modifications necessary Total: (5 or greater = High Risk): 1 ©2010 [facility] [address] [place] [address] Patent #[id number]. Federal Law prohibits the replication, distribution or use without written permission from [facility] [employer] Current Medications:  Pantoprazole 40mg Date Last Reviewed:  2/13/2019 Number of Personal Factors/Comorbidities that affect the Plan of Care: 1-2: MODERATE COMPLEXITY EXAMINATION:  
Observation/Orthostatic Postural Assessment:   
       Shoulder symmetry exhibits right shoulder elevated. Shoulder girdles are right elevated. Scapular position is right protracted and elevated. Clavicles are right elevated . Soft tissue observations indicates restrictions in right greater than left pectoralis major and minor, right subscapularis, upper trapezius right greater than left, levator scapula, right greater than left, thoracic spine paraspinals. Patient exhibits a increased cervical lordosis,  neutral thoracic kyphosis with convex right type I curve , and slight increased lumbar lordosis. Lower quadrant bony landmarks are left iliac crest elevated. Palpation:  Myofascial assessment indicates restrictions in anterior chest. Muscle length testing levator scapulae with ipsilateral arm abduction is restricted right. Upper trapezius length is restricted right greater than left. Pectoralis minor/major and latissimus dorsi length is restricted right greater than left. Scalene muscle length is restricted right greater than left.  
 
ROM: Gross active cervical spine rotation is 80% available right, 85% left.  Apley's scratch test for functional ER/IR is right base of skull, L5 for IR. Passive Accessory Motion: Glenohumeral mobility is restricted for inferior glide. Acromioclavicular mobility is decreased inferior glide. Sternoclavicular mobility is decreased p/a. Scapulothoracic mobility is restricted in posterior depression of right scapula. AROM(PROM) in degrees Right Left Shoulder flexion 0-140 0-160 Shoulder abduction (palm down) 0-85 0-155 Shoulder extension 0-20 0-20 Shoulder internal rotation (IR) 
(measured at 90 degrees of abduction) 0-30 0-45 ER (measured at 90 degrees of abduction) 0-35 0-50 Strength:  
Manual Muscle Test (*/5) Right Left Shoulder flexion 4 5 Shoulder extension 4 5 Shoulder abduction 4 5 Shoulder adduction 4 5 Shoulder ER 4 5 Shoulder IR 4 5 Upper trapezius 4 5 Middle trapezius 4 4 Lower trapezius 4 4 Rhomboids 4 4 Serratus Anterior 4 4 Elbow flexion 5 5 Elbow extension 5 5 Special Tests:   
Impingement tests performed on the right shoulder: 
Olivas-Adarsh test: positive. Neer test: negative. Neurological Screen: Myotomes: Key muscle strength testing for bilateral UE is intact. Dermatomes: Sensation testing through bilateral UE for light touch is intact. Reflexes: Biceps (C5), brachioradialis (C6), and triceps (C7) are not tested today. Neural tension tests: Upper limb tension test A is negative for exacerbation of patient's symptoms. Functional Mobility:  Challenged with sleeping positions, desk ergonomics and sitting posture Body Structures Involved: 1. Nerves 2. Thoracic Cage 3. Joints 4. Muscles Body Functions Affected: 1. Sensory/Pain 2. Neuromusculoskeletal 
3. Movement Related Activities and Participation Affected: 1. Mobility 2. Self Care 3. Community, Social and [place] Number of elements (examined above) that affect the Plan of Care: 4+: HIGH COMPLEXITY CLINICAL PRESENTATION:  
 Presentation: Evolving clinical presentation with changing clinical characteristics: MODERATE COMPLEXITY CLINICAL DECISION MAKING:  
Outcome Measure: Tool Used: Disabilities of the Arm, Shoulder and Hand (DASH) Questionnaire - Quick Version Score:  Initial: 32/55  Most Recent: X/55 (Date: -- ) Interpretation of Score: The DASH is designed to measure the activities of daily living in person's with upper extremity dysfunction or pain. Each section is scored on a 1-5 scale, 5 representing the greatest disability. The scores of each section are added together for a total score of 55. Medical Necessity:  
· Patient is expected to demonstrate progress in strength, range of motion, balance and coordination to increase independence with ADLs, sleeping positions and work station set up/postures . Reason for Services/Other Comments: 
· Patient continues to require skilled intervention due to increased pain levels in right shoulder and challenged with performing daily activities. .  
Use of outcome tool(s) and clinical judgement create a POC that gives a: Questionable prediction of patient's progress: MODERATE COMPLEXITY  
  
 
 
 
TREATMENT:  
(In addition to Assessment/Re-Assessment sessions the following treatments were rendered) Pre-treatment Symptoms/Complaints:  Patient notes has been consistent with self stretches, however relief only last for 12 hours. Pain: Initial:  
Pain Intensity 1: 7 Pain Location 1: Shoulder, Spine, cervical 
Pain Orientation 1: Left, Right  Post Session:  5/10 Therapeutic Exercise: (5 Minutes):  Exercises per grid below to improve mobility, strength, balance and coordination. Required minimal verbal and manual cues to promote proper body alignment, promote proper body posture, promote proper body mechanics and promote proper body breathing techniques. Progressed resistance, range, repetitions and complexity of movement as indicated. Date: 
2/13/2019 Activity/Exercise Parameters Review HEP: self pec stretches on foam roller X 5 minutes Prone Y, T, I on ball X 15 reps 5 sec holds Manual Therapy (    Soft Tissue Mobilization Duration Duration: 5 Minutes): Manual techniques to facilitate improved motion and decreased pain. (Used abbreviations: MET - muscle energy technique; PNF - proprioceptive neuromuscular facilitation; NMR - neuromuscular re-education; a/p - anterior to posterior; p/a - posterior to anterior) · Supine anterior chest soft tissue mobilization with breathing techniques · Supine right shoulder: subscapularis soft tissue mobilization with FMP of shoulder [facility] 
Treatment/Session Assessment:   
· Response to Treatment:  Improved mobility noted in right shoulder and anterior chest. 
· Compliance with Program/Exercises: Compliant all of the time, Will assess as treatment progresses. · Recommendations/Intent for next treatment session: \"Next visit will focus on advancements to more challenging activities\". Total Treatment Duration: PT Patient Time In/Time Out Time In: 0730 Time Out: 0830 Celina Ricks PT Future Appointments Date Time Provider Shilo Alberto 2/25/2019 10:30 AM Kirsten ANDREW, PT [facility]  
3/4/2019 10:30 AM [facility]  
3/11/2019 10:30 AM [facility]  
3/18/2019 10:30 AM Kirsten ANDREW, PT [facility]  
3/25/2019 10:30 AM [facility]

## 2019-02-18 ENCOUNTER — HOSPITAL ENCOUNTER (OUTPATIENT)
Dept: PHYSICAL THERAPY | Age: 49
Discharge: HOME | End: 2019-02-18
Payer: COMMERCIAL

## 2019-02-18 PROCEDURE — 97110 THERAPEUTIC EXERCISES: CPT

## 2019-02-18 PROCEDURE — 97140 MANUAL THERAPY 1/> REGIONS: CPT

## 2019-02-18 NOTE — PROGRESS NOTES
Charlott Merlin : 1970 Primary: [place] [id number] [address] [facility]* Secondary:  Therapy Center at [address] [phone]   [phone] OUTPATIENT PHYSICAL THERAPY:Daily Note 2019 ICD-10: Treatment Diagnosis: pain in joint, right shoulder M25.511 ICD-10: Treatment Diagnosis: thoracic spine pain M54.6 ICD-10: Treatment Diagnosis: cervicalgia M54.2 Precautions/Allergies: penicillin MD Orders: self-referred MEDICAL/REFERRING DIAGNOSIS: 
Pain in right shoulder [M25.511] Pain in right arm [M79.601] DATE OF ONSET: 2018 REFERRING PHYSICIAN: Referred, Self, MD 
RETURN PHYSICIAN APPOINTMENT: as needed INITIAL ASSESSMENT:  Mr. Caroline Biswas is a 52 y.o. male who presents to physical therapy with right shoulder pain and cervical/thoracic spine pain. He demonstrates limited ROM in right shoulder girdle, soft tissue restrictions, cervical and thoracic spine arthrokinematic dysfunctions, strength deficits in postural stabilizers. He would benefit from skilled physical therapy to improve overall mobility in right shoulder, cervical/thoracic spine and improve overall strength and endurance. PROBLEM LIST (Impacting functional limitations): 1. Decreased Strength 2. Decreased ADL/Functional Activities 3. Increased Pain 4. Decreased Activity Tolerance 5. Decreased Flexibility/Joint Mobility INTERVENTIONS PLANNED: (Treatment may consist of any combination of the following) 1. Home Exercise Program (HEP) 2. Manual Therapy 3. Neuromuscular Re-education/Strengthening 4. Range of Motion (ROM) 5. Therapeutic Exercise/Strengthening TREATMENT PLAN: 
Effective Dates: 2019 TO 3/15/2019 (30 days). Frequency/Duration: 1 time a week for 30 Day(s) GOALS: (Goals have been discussed and agreed upon with patient.) Discharge Goals: Time Frame: 4 weeks 1.  Patient demonstrates independence with HEP without verbal cueing from therapist. 
2. Patient demonstrates right shoulder AROM to 0-155 degrees flexion, 0-155 degrees abduction 3. Patient able to sleep for 4 consecutive hours with proper pillow and position to decrease strain to right shoulder. 4. Improve DASH outcome measure score from 32/55 to 15/55 to perform ADLs. Rehabilitation Potential For Stated Goals: Excellent Regarding Shaw Aldrich's therapy, I certify that the treatment plan above will be carried out by a therapist or under their direction. Raissa Orellana PT The information in this section was collected on 2/13/19 (except where otherwise noted). HISTORY:  
History of Present Injury/Illness (Reason for Referral): Chronic history of right shoulder and cervical spine pain with increased pain in right pectoralis, right biceps and lateral arm since November 2018. He has noted some improvements with self stretches. He has increased amount of weight lifting over the past few months. He has also noted bilateral UE numbness, left greater than right around the elbow and into 5th digit. He notes challenges with sleeping, has started sleeping with his right shoulder under his neck and pillow. Has also noted tightness in left cervical spine. Increase stress in life and notes tension with bilateral upper trapezius. Patient denies dizziness, drop attacks, numbness/tingling with exception of BUE 5th digits, left greater than right, bowel/bladder dysfunction and/or unexplained weight gain/loss. Past Medical History/Comorbidities:  
Mr. Kaylee Camilo  has past medical history of anxiety, joint pain and numbness in LUE. Mr. Kaylee Camilo  has no past surgical history Social History/Living Environment:  
  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 
Independent with activities, however challenged with use of computer/desk sitting secondary to pain in cervical/thoracic spine and right shoulder. Dominant Side: RIGHT Ambulatory/Rehab Services H2 Model Falls Risk Assessment Risk Factors: 
     (1)  Gender [Male] Ability to Rise from Chair: 
     (0)  Ability to rise in a single movement Falls Prevention Plan: No modifications necessary Total: (5 or greater = High Risk): 1 ©2010 [facility] [address] [place] [address] Patent #[id number]. Federal Law prohibits the replication, distribution or use without written permission from [facility] "[employer] Current Medications:  Pantoprazole 40mg Date Last Reviewed:  2/18/2019 Number of Personal Factors/Comorbidities that affect the Plan of Care: 1-2: MODERATE COMPLEXITY EXAMINATION:  
Observation/Orthostatic Postural Assessment:   
       Shoulder symmetry exhibits right shoulder elevated. Shoulder girdles are right elevated. Scapular position is right protracted and elevated. Clavicles are right elevated . Soft tissue observations indicates restrictions in right greater than left pectoralis major and minor, right subscapularis, upper trapezius right greater than left, levator scapula, right greater than left, thoracic spine paraspinals. Patient exhibits a increased cervical lordosis,  neutral thoracic kyphosis with convex right type I curve , and slight increased lumbar lordosis. Lower quadrant bony landmarks are left iliac crest elevated. Palpation:  Myofascial assessment indicates restrictions in anterior chest. Muscle length testing levator scapulae with ipsilateral arm abduction is restricted right. Upper trapezius length is restricted right greater than left. Pectoralis minor/major and latissimus dorsi length is restricted right greater than left. Scalene muscle length is restricted right greater than left. ROM: Gross active cervical spine rotation is 80% available right, 85% left.   Apley's scratch test for functional ER/IR is right base of skull, L5 for IR. Passive Accessory Motion: Glenohumeral mobility is restricted for inferior glide. Acromioclavicular mobility is decreased inferior glide. Sternoclavicular mobility is decreased p/a. Scapulothoracic mobility is restricted in posterior depression of right scapula. AROM(PROM) in degrees Right Left Shoulder flexion 0-140 0-160 Shoulder abduction (palm down) 0-85 0-155 Shoulder extension 0-20 0-20 Shoulder internal rotation (IR) 
(measured at 90 degrees of abduction) 0-30 0-45 ER (measured at 90 degrees of abduction) 0-35 0-50 Strength:  
Manual Muscle Test (*/5) Right Left Shoulder flexion 4 5 Shoulder extension 4 5 Shoulder abduction 4 5 Shoulder adduction 4 5 Shoulder ER 4 5 Shoulder IR 4 5 Upper trapezius 4 5 Middle trapezius 4 4 Lower trapezius 4 4 Rhomboids 4 4 Serratus Anterior 4 4 Elbow flexion 5 5 Elbow extension 5 5 Special Tests:   
Impingement tests performed on the right shoulder: 
Olivas-[place] test: positive. Neer test: negative. Neurological Screen: Myotomes: Key muscle strength testing for bilateral UE is intact. Dermatomes: Sensation testing through bilateral UE for light touch is intact. Reflexes: Biceps (C5), brachioradialis (C6), and triceps (C7) are not tested today. Neural tension tests: Upper limb tension test A is negative for exacerbation of patient's symptoms. Functional Mobility:  Challenged with sleeping positions, desk ergonomics and sitting posture Body Structures Involved: 1. Nerves 2. Thoracic Cage 3. Joints 4. Muscles Body Functions Affected: 1. Sensory/Pain 2. Neuromusculoskeletal 
3. Movement Related Activities and Participation Affected: 1. Mobility 2. Self Care 3. Community, Social and [place] Number of elements (examined above) that affect the Plan of Care: 4+: HIGH COMPLEXITY CLINICAL PRESENTATION:  
Presentation: Evolving clinical presentation with changing clinical characteristics: MODERATE COMPLEXITY CLINICAL DECISION MAKING:  
Outcome Measure: Tool Used: Disabilities of the Arm, Shoulder and Hand (DASH) Questionnaire - Quick Version Score:  Initial: 32/55  Most Recent: X/55 (Date: -- ) Interpretation of Score: The DASH is designed to measure the activities of daily living in person's with upper extremity dysfunction or pain. Each section is scored on a 1-5 scale, 5 representing the greatest disability. The scores of each section are added together for a total score of 55. Medical Necessity:  
· Patient is expected to demonstrate progress in strength, range of motion, balance and coordination to increase independence with ADLs, sleeping positions and work station set up/postures . Reason for Services/Other Comments: 
· Patient continues to require skilled intervention due to increased pain levels in right shoulder and challenged with performing daily activities. .  
Use of outcome tool(s) and clinical judgement create a POC that gives a: Questionable prediction of patient's progress: MODERATE COMPLEXITY  
  
 
 
 
TREATMENT:  
(In addition to Assessment/Re-Assessment sessions the following treatments were rendered) Pre-treatment Symptoms/Complaints:  Patient notes less pain in his right shoulder after last session, has been consistent with home exercises and stretches. Pain: Initial:  
Pain Intensity 1: 5 Pain Location 1: Shoulder Pain Orientation 1: Right  Post Session:  5/10 Therapeutic Exercise: (15 Minutes):  Exercises per grid below to improve mobility, strength, balance and coordination. Required minimal verbal and manual cues to promote proper body alignment, promote proper body posture, promote proper body mechanics and promote proper body breathing techniques. Progressed resistance, range, repetitions and complexity of movement as indicated. Date: 
2/18/2019 Activity/Exercise Parameters Review HEP: self pec stretches on foam roller Prone Y, T, I on ball X 15 reps 5 sec holds Side lying arm circles X 10 reps, clockwise, counter clockwise Seated scapular retraction X 10 reps, 10 sec holds, green band Standing pivot prone X 10 reps, 10 sec holds Review sitting posture and desk set up X 5 minutes review Manual Therapy (    Soft Tissue Mobilization Duration Duration: 40 Minutes): Manual techniques to facilitate improved motion and decreased pain. (Used abbreviations: MET - muscle energy technique; PNF - proprioceptive neuromuscular facilitation; NMR - neuromuscular re-education; a/p - anterior to posterior; p/a - posterior to anterior; FMP - functional movement pattern) · Supine anterior chest soft tissue mobilization with breathing techniques · Supine right shoulder: subscapularis soft tissue mobilization with FMP of shoulder elevation · Side lying left for right soft tissue mobilization of latissimus dorsi with FMP of shoulder abduction · Side lying left for prolonged holds into posterior depression of scapula through phasic shakes. · Prone soft tissue mobilization to bony contours of medial border of scapula, strumming techniques to right infraspinatus. [facility] 
Treatment/Session Assessment:   
· Response to Treatment:  Decreased soft tissue restrictions noted, improved mobility noted and latissimus dorsi and pectoralis major/minor · Compliance with Program/Exercises: Compliant all of the time, Will assess as treatment progresses. · Recommendations/Intent for next treatment session: \"Next visit will focus on advancements to more challenging activities\". Total Billable Treatment Duration: 55 minutes PT Patient Time In/Time Out Time In: 1030 Time Out: 1130 Paola Vides PT Future Appointments Date Time Provider Shilo Alberto 2/25/2019 10:30 AM [facility] 3/4/2019 10:30 AM [facility]  
3/11/2019 10:30 AM Zen ANDREW, [facility]  
3/18/2019 10:30 AM Zen ANDREW, PT [facility]  
3/25/2019 10:30 AM Kevin Pierre, PT [facility]

## 2019-02-25 ENCOUNTER — HOSPITAL ENCOUNTER (OUTPATIENT)
Dept: PHYSICAL THERAPY | Age: 49
End: 2019-02-25
Payer: COMMERCIAL

## 2019-02-25 ENCOUNTER — HOSPITAL ENCOUNTER (OUTPATIENT)
Dept: PHYSICAL THERAPY | Age: 49
Discharge: HOME | End: 2019-02-25
Payer: COMMERCIAL

## 2019-02-25 PROCEDURE — 97140 MANUAL THERAPY 1/> REGIONS: CPT

## 2019-02-25 PROCEDURE — 97110 THERAPEUTIC EXERCISES: CPT

## 2019-02-25 NOTE — PROGRESS NOTES
Jenni Benavides : 1970 [facility] Secondary:  Therapy Center at [address] [phone]   [phone] OUTPATIENT PHYSICAL THERAPY:Daily Note 2019 ICD-10: Treatment Diagnosis: pain in joint, right shoulder M25.511 ICD-10: Treatment Diagnosis: thoracic spine pain M54.6 ICD-10: Treatment Diagnosis: cervicalgia M54.2 Precautions/Allergies: penicillin MD Orders: self-referred MEDICAL/REFERRING DIAGNOSIS: 
Pain in right shoulder [M25.511] Pain in right arm [M79.601] DATE OF ONSET: 2018 REFERRING PHYSICIAN: Referred, Self, MD 
RETURN PHYSICIAN APPOINTMENT: as needed INITIAL ASSESSMENT:  Mr. Shilo quintana is a 52 y.o. male who presents to physical therapy with right shoulder pain and cervical/thoracic spine pain. He demonstrates limited ROM in right shoulder girdle, soft tissue restrictions, cervical and thoracic spine arthrokinematic dysfunctions, strength deficits in postural stabilizers. He would benefit from skilled physical therapy to improve overall mobility in right shoulder, cervical/thoracic spine and improve overall strength and endurance. PROBLEM LIST (Impacting functional limitations): 1. Decreased Strength 2. Decreased ADL/Functional Activities 3. Increased Pain 4. Decreased Activity Tolerance 5. Decreased Flexibility/Joint Mobility INTERVENTIONS PLANNED: (Treatment may consist of any combination of the following) 1. Home Exercise Program (HEP) 2. Manual Therapy 3. Neuromuscular Re-education/Strengthening 4. Range of Motion (ROM) 5. Therapeutic Exercise/Strengthening TREATMENT PLAN: 
Effective Dates: 2019 TO 3/15/2019 (30 days). Frequency/Duration: 1 time a week for 30 Day(s) GOALS: (Goals have been discussed and agreed upon with patient.) Discharge Goals: Time Frame: 4 weeks 1.  Patient demonstrates independence with HEP without verbal cueing from therapist. 
2. Patient demonstrates right shoulder AROM to 0-155 degrees flexion, 0-155 degrees abduction 3. Patient able to sleep for 4 consecutive hours with proper pillow and position to decrease strain to right shoulder. 4. Improve DASH outcome measure score from 32/55 to 15/55 to perform ADLs. Rehabilitation Potential For Stated Goals: Excellent Regarding Shaw Aldrich's therapy, I certify that the treatment plan above will be carried out by a therapist or under their direction. Irving Ross PT The information in this section was collected on 2/13/19 (except where otherwise noted). HISTORY:  
History of Present Injury/Illness (Reason for Referral): Chronic history of right shoulder and cervical spine pain with increased pain in right pectoralis, right biceps and lateral arm since November 2018. He has noted some improvements with self stretches. He has increased amount of weight lifting over the past few months. He has also noted bilateral UE numbness, left greater than right around the elbow and into 5th digit. He notes challenges with sleeping, has started sleeping with his right shoulder under his neck and pillow. Has also noted tightness in left cervical spine. Increase stress in life and notes tension with bilateral upper trapezius. Patient denies dizziness, drop attacks, numbness/tingling with exception of BUE 5th digits, left greater than right, bowel/bladder dysfunction and/or unexplained weight gain/loss. Past Medical History/Comorbidities:  
Mr. Shilo quintana  has past medical history of anxiety, joint pain and numbness in LUE. Mr. Shilo quintana  has no past surgical history Social History/Living Environment:  
  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 
Independent with activities, however challenged with use of computer/desk sitting secondary to pain in cervical/thoracic spine and right shoulder. Dominant Side: RIGHT Ambulatory/Rehab Services H2 Model Falls Risk Assessment Risk Factors: 
     (1)  Gender [Male] Ability to Rise from Chair: 
     (0)  Ability to rise in a single movement Falls Prevention Plan: No modifications necessary Total: (5 or greater = High Risk): 1 ©2010 [facility] [address] [place] [address] Patent #[id number]. Federal Law prohibits the replication, distribution or use without written permission from [facility] [employer] Current Medications:  Pantoprazole 40mg Date Last Reviewed:  2/25/2019 Number of Personal Factors/Comorbidities that affect the Plan of Care: 1-2: MODERATE COMPLEXITY EXAMINATION:  
Observation/Orthostatic Postural Assessment:   
       Shoulder symmetry exhibits right shoulder elevated. Shoulder girdles are right elevated. Scapular position is right protracted and elevated. Clavicles are right elevated . Soft tissue observations indicates restrictions in right greater than left pectoralis major and minor, right subscapularis, upper trapezius right greater than left, levator scapula, right greater than left, thoracic spine paraspinals. Patient exhibits a increased cervical lordosis,  neutral thoracic kyphosis with convex right type I curve , and slight increased lumbar lordosis. Lower quadrant bony landmarks are left iliac crest elevated. Palpation:  Myofascial assessment indicates restrictions in anterior chest. Muscle length testing levator scapulae with ipsilateral arm abduction is restricted right. Upper trapezius length is restricted right greater than left. Pectoralis minor/major and latissimus dorsi length is restricted right greater than left. Scalene muscle length is restricted right greater than left. ROM: Gross active cervical spine rotation is 80% available right, 85% left.   Apley's scratch test for functional ER/IR is right base of skull, L5 for IR. Passive Accessory Motion: Glenohumeral mobility is restricted for inferior glide. Acromioclavicular mobility is decreased inferior glide. Sternoclavicular mobility is decreased p/a. Scapulothoracic mobility is restricted in posterior depression of right scapula. AROM(PROM) in degrees Right Left Shoulder flexion 0-140 0-160 Shoulder abduction (palm down) 0-85 0-155 Shoulder extension 0-20 0-20 Shoulder internal rotation (IR) 
(measured at 90 degrees of abduction) 0-30 0-45 ER (measured at 90 degrees of abduction) 0-35 0-50 Strength:  
Manual Muscle Test (*/5) Right Left Shoulder flexion 4 5 Shoulder extension 4 5 Shoulder abduction 4 5 Shoulder adduction 4 5 Shoulder ER 4 5 Shoulder IR 4 5 Upper trapezius 4 5 Middle trapezius 4 4 Lower trapezius 4 4 Rhomboids 4 4 Serratus Anterior 4 4 Elbow flexion 5 5 Elbow extension 5 5 Special Tests:   
Impingement tests performed on the right shoulder: 
Olivas-[place] test: positive. Neer test: negative. Neurological Screen: Myotomes: Key muscle strength testing for bilateral UE is intact. Dermatomes: Sensation testing through bilateral UE for light touch is intact. Reflexes: Biceps (C5), brachioradialis (C6), and triceps (C7) are not tested today. Neural tension tests: Upper limb tension test A is negative for exacerbation of patient's symptoms. Functional Mobility:  Challenged with sleeping positions, desk ergonomics and sitting posture Body Structures Involved: 1. Nerves 2. Thoracic Cage 3. Joints 4. Muscles Body Functions Affected: 1. Sensory/Pain 2. Neuromusculoskeletal 
3. Movement Related Activities and Participation Affected: 1. Mobility 2. Self Care 3. Community, Social and [place] Number of elements (examined above) that affect the Plan of Care: 4+: HIGH COMPLEXITY CLINICAL PRESENTATION:  
Presentation: Evolving clinical presentation with changing clinical characteristics: MODERATE COMPLEXITY CLINICAL DECISION MAKING:  
Outcome Measure: Tool Used: Disabilities of the Arm, Shoulder and Hand (DASH) Questionnaire - Quick Version Score:  Initial: 32/55  Most Recent: X/55 (Date: -- ) Interpretation of Score: The DASH is designed to measure the activities of daily living in person's with upper extremity dysfunction or pain. Each section is scored on a 1-5 scale, 5 representing the greatest disability. The scores of each section are added together for a total score of 55. Medical Necessity:  
· Patient is expected to demonstrate progress in strength, range of motion, balance and coordination to increase independence with ADLs, sleeping positions and work station set up/postures . Reason for Services/Other Comments: 
· Patient continues to require skilled intervention due to increased pain levels in right shoulder and challenged with performing daily activities. .  
Use of outcome tool(s) and clinical judgement create a POC that gives a: Questionable prediction of patient's progress: MODERATE COMPLEXITY  
  
 
 
 
TREATMENT:  
(In addition to Assessment/Re-Assessment sessions the following treatments were rendered) Pre-treatment Symptoms/Complaints:  Patient notes continues to note improved right shoulder ROM and less stiffness. Doing well with his exercises at home. Pain: Initial:  
Pain Intensity 1: 4 Pain Location 1: Shoulder Pain Orientation 1: Right  Post Session:  3/10 Therapeutic Exercise: (15 Minutes):  Exercises per grid below to improve mobility, strength, balance and coordination. Required minimal verbal and manual cues to promote proper body alignment, promote proper body posture, promote proper body mechanics and promote proper body breathing techniques. Progressed resistance, range, repetitions and complexity of movement as indicated. Date: 
2/25/2019 Activity/Exercise Parameters Review HEP: self pec stretches on foam roller X 5 minute review of gym exercises and machines for rhomboid, lats and mid and lower trapezius. Prone Y, T, I on ball X 15 reps 5 sec holds Side lying arm circles X 10 reps, clockwise, counter clockwise Seated scapular retraction X 10 reps, 10 sec holds, green band Standing pivot prone X 10 reps, 10 sec holds Review sitting posture and desk set up Prone prop scapular stabilization X 10 rep 10 sec holds, green t-band Manual Therapy (    Soft Tissue Mobilization Duration Duration: 40 Minutes): Manual techniques to facilitate improved motion and decreased pain. (Used abbreviations: MET - muscle energy technique; PNF - proprioceptive neuromuscular facilitation; NMR - neuromuscular re-education; a/p - anterior to posterior; p/a - posterior to anterior; FMP - functional movement pattern) · Supine anterior chest soft tissue mobilization, bony contour release around superior and inferior clavicle with breathing techniques · Supine right shoulder: subscapularis soft tissue mobilization with FMP of shoulder elevation · Side lying left for right soft tissue mobilization of latissimus dorsi with FMP of shoulder abduction · Side lying left for prolonged holds into posterior depression of scapula through phasic shakes. · Prone soft tissue mobilization to bony contours of medial border of scapula, strumming techniques to right infraspinatus. [facility] 
Treatment/Session Assessment:   
· Response to Treatment:  Improving overall mobility in right shoulder girdle, challenged with prone prop stability. · Compliance with Program/Exercises: Compliant all of the time, Will assess as treatment progresses. · Recommendations/Intent for next treatment session: \"Next visit will focus on advancements to more challenging activities\". Total Billable Treatment Duration: 55 minutes PT Patient Time In/Time Out Time In: 1030 Time Out: 1134 Monica Castillo, PT Future Appointments Date Time Provider Shilo Dolly 3/4/2019 10:30 AM Bertram ANDREW, PT [facility]  
3/11/2019 10:30 AM Bertram ANDREW, PT [facility]  
3/18/2019 10:30 AM Bertram ANDREW, PT [facility]  
3/25/2019 10:30 AM Martin Woodruff, PT [facility]

## 2019-03-04 ENCOUNTER — HOSPITAL ENCOUNTER (OUTPATIENT)
Dept: PHYSICAL THERAPY | Age: 49
Discharge: HOME | End: 2019-03-04
Payer: COMMERCIAL

## 2019-03-04 PROCEDURE — 97140 MANUAL THERAPY 1/> REGIONS: CPT

## 2019-03-04 PROCEDURE — 97110 THERAPEUTIC EXERCISES: CPT

## 2019-03-04 NOTE — PROGRESS NOTES
Charlott Merlin : 1970 Primary: [facility]* Secondary:  [facility] at [place] [address] [address], [address] [phone]   [phone] OUTPATIENT PHYSICAL THERAPY: Daily Treatment Note 3/4/2019 Pre-treatment Symptoms/Complaints:  Patient noted increased tension and sharp pain into his left and right shoulder blade while driving yesterday, continues to be challenged with right pectoralis tightness. Pain: Initial: Pain Intensity 1: 5 Pain Location 1: Shoulder, Spine, thoracic Pain Orientation 1: Right  Post Session:  3/10 Medications Last Reviewed:  3/4/2019 Updated Objective Findings:  right infraspinatus restrictions, limited bilateral thoracic spine extension T2-3 
 TREATMENT:  
THERAPEUTIC EXERCISE: (15 minutes):  Exercises per grid below to improve mobility, strength, balance and coordination. Required minimal verbal, manual and tactile cues to promote proper body alignment, promote proper body posture, promote proper body mechanics and promote proper body breathing techniques. Progressed resistance, range, repetitions and complexity of movement as indicated. Date: 
3/4/2019 Activity/Exercise Parameters Review HEP X 5 minutes Prone Y, T, I on ball Side lying arm circles X 10 reps Seated scapular retraction X 10 reps, 15 sec holds Prone prop scapular stabilization X 10 reps, 10 sec holds green band Supine diaphragmatic breathing X 5 reps, X 5 minute education MANUAL THERAPY: (40 minutes): Joint mobilization, Soft tissue mobilization and Manipulation was utilized and necessary because of the patient's restricted joint motion and restricted motion of soft tissue. (Used abbreviations: MET - muscle energy technique; PNF - proprioceptive neuromuscular facilitation; NMR - neuromuscular re-education; a/p - anterior to posterior; p/a - posterior to anterior; FMP - functional movement pattern) · Supine anterior chest soft tissue mobilization, bony contour release around superior and inferior clavicle with breathing techniques bilaterally into manubrium and ribs 3-4 anterior. · Supine right shoulder: subscapularis soft tissue mobilization with FMP of shoulder elevation · Side lying left for right soft tissue mobilization of latissimus dorsi with FMP of shoulder abduction · Side lying left for prolonged holds into posterior depression of scapula through phasic shakes. · Prone soft tissue mobilization to bony contours of medial border of scapula, strumming techniques to right infraspinatus, prone intercostal soft tissue mobilization with breathing techniques. [facility] 
Treatment/Session Summary: · Response to Treatment:  improved awareness of posture in sitting and standing, decreased soft tissue restrictions in costal cage and anterior chest.. · Communication/Consultation:  None today · Equipment provided today:  None today · Recommendations/Intent for next treatment session: Next visit will focus on continued postural stability and breathing techniques as well as soft tissue mobilization for right shoulder girdle and costal cage. Treatment Plan of Care Effective Dates:  2/13/19 to 3/15/19 Total Treatment Billable Duration:  55 minutes PT Patient Time In/Time Out Time In: 1030 Time Out: 1130 Cole Linares, PT Future Appointments Date Time Provider Shilo Alberto 3/11/2019 10:30 AM Enrico ANDREW PT OFF [facility]  
3/18/2019 10:30 AM Enrico ANDREW PT   
3/25/2019 10:30 AM Jose Luis Lopez PT

## 2019-03-11 ENCOUNTER — HOSPITAL ENCOUNTER (OUTPATIENT)
Dept: PHYSICAL THERAPY | Age: 49
Discharge: HOME | End: 2019-03-11
Payer: COMMERCIAL

## 2019-03-11 PROCEDURE — 97110 THERAPEUTIC EXERCISES: CPT

## 2019-03-11 PROCEDURE — 97140 MANUAL THERAPY 1/> REGIONS: CPT

## 2019-03-11 NOTE — PROGRESS NOTES
Diana Walton  : 1970  Primary: [facility]*  Secondary:  [facility] at [address]  [phone]   [phone]        OUTPATIENT PHYSICAL THERAPY: Daily Treatment Note 3/11/2019  Pre-treatment Symptoms/Complaints:  Patient noted increased tension and sharp pain into his left and right shoulder blade while driving yesterday, continues to be challenged with right pectoralis tightness. Pain: Initial: Pain Intensity 1: 4  Pain Location 1: Shoulder  Pain Orientation 1: Right  Post Session:  3/10   Medications Last Reviewed:  3/11/2019  Updated Objective Findings:  Limited inferior glide of right glenohumeral joint     AROM(PROM) in degrees   Right  3/11/2019   Shoulder flexion 0-150   Shoulder abduction (palm down) 0-95   Shoulder extension 0-20   Shoulder internal rotation (IR)  (measured at 90 degrees of abduction) 0-35   ER (measured at 90 degrees of abduction) 0-40           TREATMENT:   THERAPEUTIC EXERCISE: (15 minutes):  Exercises per grid below to improve mobility, strength, balance and coordination. Required minimal verbal, manual and tactile cues to promote proper body alignment, promote proper body posture, promote proper body mechanics and promote proper body breathing techniques. Progressed resistance, range, repetitions and complexity of movement as indicated. Date:  3/11/2019     Activity/Exercise Parameters   Review HEP X 5 minutes   Prone Y, T, I on ball    Side lying arm circles X 10 reps   Seated scapular retraction X 10 reps, 15 sec holds   Prone prop scapular stabilization X 10 reps, 10 sec holds green band   Supine diaphragmatic breathing X 5 reps,     Inferior glide of glenohumeral joint with t-band in door X 10 reps, 10 sec holds, blue band   Pectoralis stretching on foam roller 2 x 2 minutes holds.      MANUAL THERAPY: (40 minutes): Joint mobilization, Soft tissue mobilization and Manipulation was utilized and necessary because of the patient's restricted joint motion and restricted motion of soft tissue. (Used abbreviations: MET - muscle energy technique; PNF - proprioceptive neuromuscular facilitation; NMR - neuromuscular re-education; a/p - anterior to posterior; p/a - posterior to anterior; FMP - functional movement pattern)     · Supine anterior chest soft tissue mobilization, bony contour release around superior and inferior clavicle with breathing techniques bilaterally into manubrium and ribs 3-4 anterior. · Supine right shoulder: subscapularis soft tissue mobilization with FMP of shoulder elevation  · Side lying left for right soft tissue mobilization of latissimus dorsi with FMP of shoulder abduction  · Side lying left for prolonged holds into posterior depression of scapula through phasic shakes. · Prone soft tissue mobilization to bony contours of medial border of scapula, strumming techniques to right infraspinatus, prone intercostal soft tissue mobilization with breathing techniques. [facility] Portal  Treatment/Session Summary:    · Response to Treatment:  improved awareness of inferior glide of glenohumeral joint and continues to improve and demonstate awareness of his posture in sitting and standing. · Communication/Consultation:  None today  · Equipment provided today:  None today  · Recommendations/Intent for next treatment session: Next visit will focus on continued postural and shoulder girdle strengthening, as well as addressing remaining soft tissue restrictions. Patient will be going to his PCP to obtain a referral for continued therapy services.    Treatment Plan of Care Effective Dates:  2/13/19 to 3/15/19  Total Treatment Billable Duration:  55 minutes  PT Patient Time In/Time Out  Time In: 1030  Time Out: Sara Martin PT    Future Appointments   Date Time Provider Shilo Alberto   3/18/2019 10:30 AM MELISSA Hernández   3/25/2019 10:30 AM Bulmaro Rowland., PT [facility]

## 2019-03-18 ENCOUNTER — HOSPITAL ENCOUNTER (OUTPATIENT)
Dept: PHYSICAL THERAPY | Age: 49
Discharge: HOME | End: 2019-03-18
Payer: COMMERCIAL

## 2019-03-18 NOTE — PROGRESS NOTES
Asya Limon   (CML:3/19/1931) Therapy Center at  [place] [id number] [address]  [phone]  [phone]             DATE: 3/18/2019    Patient canceled for appointment today due to has not obtained referral from MD, plans to have by the end of this week. Will plan to follow up on next scheduled visit.     Crispin Lomas PT, DPT, CFMT

## 2019-03-25 ENCOUNTER — HOSPITAL ENCOUNTER (OUTPATIENT)
Dept: PHYSICAL THERAPY | Age: 49
Discharge: HOME | End: 2019-03-25
Payer: COMMERCIAL

## 2019-03-25 PROCEDURE — 97140 MANUAL THERAPY 1/> REGIONS: CPT

## 2019-03-25 PROCEDURE — 97110 THERAPEUTIC EXERCISES: CPT

## 2019-03-25 NOTE — PROGRESS NOTES
Katie Tsang : 1970 Primary: [facility]* Secondary:  Therapy [facility] at [address], [address] [place] [address] [phone]   [phone] OUTPATIENT PHYSICAL THERAPY: Daily Treatment Note 3/25/2019 Pre-treatment Symptoms/Complaints:  Patient overall noting less pain and improved mobility in his right shoulder. No shoulder blade discomfort noted this week. Pain: Initial: Pain Intensity 1: 3 Pain Location 1: Shoulder Pain Orientation 1: Right  Post Session:  2/10 Medications Last Reviewed:  3/25/2019 Updated Objective Findings:  Limited inferior glide of right glenohumeral joint AROM(PROM) in degrees Right 
3/25/2019 Shoulder flexion 0-155 Shoulder abduction (palm down) 0-100 Shoulder extension 0-20 Shoulder internal rotation (IR) 
(measured at 90 degrees of abduction) 0-35 ER (measured at 90 degrees of abduction) 0-40  
  
 
 
 TREATMENT:  
THERAPEUTIC EXERCISE: (25 minutes):  Exercises per grid below to improve mobility, strength, balance and coordination. Required minimal verbal, manual and tactile cues to promote proper body alignment, promote proper body posture, promote proper body mechanics and promote proper body breathing techniques. Progressed resistance, range, repetitions and complexity of movement as indicated. Date: 
3/25/2019 Activity/Exercise Parameters Review HEP X 5 minutes Prone Y, T, I on ball 2 x 10 reps, 5 sec holds Side lying arm circles X 10 reps Seated scapular retraction X 10 reps, 15 sec holds Prone prop scapular stabilization X 10 reps, 10 sec holds green band Supine diaphragmatic breathing X 5 reps, Inferior glide of glenohumeral joint with t-band in door X 10 reps, 10 sec holds, blue band Pectoralis stretching on foam roller 2 x 2 minutes holds. Latissimus pulldown X 10 reps, 5 sec holds MANUAL THERAPY: (30 minutes): Joint mobilization, Soft tissue mobilization and Manipulation was utilized and necessary because of the patient's restricted joint motion and restricted motion of soft tissue. (Used abbreviations: MET - muscle energy technique; PNF - proprioceptive neuromuscular facilitation; NMR - neuromuscular re-education; a/p - anterior to posterior; p/a - posterior to anterior; FMP - functional movement pattern) · Supine anterior chest soft tissue mobilization, bony contour release around superior and inferior clavicle with breathing techniques bilaterally into manubrium and ribs 3-4 anterior. · Supine right shoulder: subscapularis soft tissue mobilization with FMP of shoulder elevation · Side lying left for right soft tissue mobilization of latissimus dorsi with FMP of shoulder abduction · Side lying left for prolonged holds into posterior depression of scapula through phasic shakes. · Side lying left for soft tissue mobilization along right medial border of scapula and along rib cage into intercostals. [facility] 
Treatment/Session Summary: · Response to Treatment:  improving overall ROM in right shoulder girdle, improved positions of scapula in resting position. . 
· Communication/Consultation:  None today · Equipment provided today:  None today · Recommendations/Intent for next treatment session: Next visit will focus on continued postural and shoulder girdle strengthening, as well as addressing remaining soft tissue restrictions. Treatment Plan of Care Effective Dates:  3/25/19 TO 5/23/19 Total Treatment Billable Duration:  55 minutes PT Patient Time In/Time Out Time In: 1030 Time Out: 1130 Paul Osullivan PT Future Appointments Date Time Provider Shilo Alberto 4/8/2019  4:30 PM Ethan ANDREW PT OFF [facility]  
5/6/2019 10:30 AM Ethan ANDREW PT OFF [facility]  
5/20/2019 10:30 AM Juan F Vidal PT [facility]

## 2019-03-26 NOTE — THERAPY RECERTIFICATION
Ailyn Blake : 1970 Primary: [facility]* Secondary:  Therapy Center at [address] [phone]   [phone] OUTPATIENT PHYSICAL THERAPY:Recertification [id number] ICD-10: Treatment Diagnosis: pain in joint, right shoulder M25.511 ICD-10: Treatment Diagnosis: thoracic spine pain M54.6 ICD-10: Treatment Diagnosis: cervicalgia M54.2 Precautions/Allergies: penicillin MD Orders: self-referred MEDICAL/REFERRING DIAGNOSIS: 
Pain in right shoulder [M25.511] Pain in right arm [M79.601] DATE OF ONSET: 2018 REFERRING PHYSICIAN: Shonna GARIBAY PHYSICIAN APPOINTMENT: as needed Attendance: As of 3/25/2019, Ailyn Blake has attended 6 out of 6 scheduled visits, with 0 cancellation(s) and 0 no shows. RE-CERTIFICATION: 4/18/10: Mr. Kenia Villa has benefited from therapy services, he is demonstrating improved ROM in his right shoulder and improved mobility through his shoulder girdle. He continues to demonstrates minor-moderate restrictions in his right glenohumeral joint inferior glide, especially with shoulder elevation. Continues to be challenged with upward rotation of scapula and posterior depression of scapula while coupled with glenohumeral movement. He would benefit from continued therapy services to improve arthrokinematics of his right shoulder girdle, improve remaining strength and endurance deficits and continue to improve awareness of overall posture. INITIAL ASSESSMENT:  Mr. Kenia Villa is a 52 y.o. male who presents to physical therapy with right shoulder pain and cervical/thoracic spine pain. He demonstrates limited ROM in right shoulder girdle, soft tissue restrictions, cervical and thoracic spine arthrokinematic dysfunctions, strength deficits in postural stabilizers.  He would benefit from skilled physical therapy to improve overall mobility in right shoulder, cervical/thoracic spine and improve overall strength and endurance. PROBLEM LIST (Impacting functional limitations): 1. Decreased Strength 2. Decreased ADL/Functional Activities 3. Increased Pain 4. Decreased Activity Tolerance 5. Decreased Flexibility/Joint Mobility INTERVENTIONS PLANNED: (Treatment may consist of any combination of the following) 1. Home Exercise Program (HEP) 2. Manual Therapy 3. Neuromuscular Re-education/Strengthening 4. Range of Motion (ROM) 5. Therapeutic Exercise/Strengthening TREATMENT PLAN: 
Effective Dates: 3/25/19 TO 5/23/2019. Frequency/Duration: every other week over the next 60 days. GOALS: (Goals have been discussed and agreed upon with patient.) Discharge Goals: Time Frame: 60 days 1. Patient demonstrates independence with HEP without verbal cueing from therapist. GOAL MET, progressing as patient continues to improve ROM and strength 2. Patient demonstrates right shoulder AROM to 0-155 degrees flexion, 0-155 degrees abduction - ONGOING 3. Patient able to sleep for 4 consecutive hours with proper pillow and position to decrease strain to right shoulder. - ONGOING 4. Improve DASH outcome measure score from 32/55 to 15/55 to perform ADLs. - ONGOING Rehabilitation Potential For Stated Goals: Excellent Regarding Shaw Aldrich's therapy, I certify that the treatment plan above will be carried out by a therapist or under their direction. Thank you for this referral, Jeniffer Al PT, DPT, CFMT Referring Physician Signature: Lloyd Nageotte Date: The information in this section was collected on 2/13/19 (except where otherwise noted). HISTORY:  
History of Present Injury/Illness (Reason for Referral): Chronic history of right shoulder and cervical spine pain with increased pain in right pectoralis, right biceps and lateral arm since November 2018. He has noted some improvements with self stretches.  He has increased amount of weight lifting over the past few months. He has also noted bilateral UE numbness, left greater than right around the elbow and into 5th digit. He notes challenges with sleeping, has started sleeping with his right shoulder under his neck and pillow. Has also noted tightness in left cervical spine. Increase stress in life and notes tension with bilateral upper trapezius. Patient denies dizziness, drop attacks, numbness/tingling with exception of BUE 5th digits, left greater than right, bowel/bladder dysfunction and/or unexplained weight gain/loss. Past Medical History/Comorbidities:  
Mr. Shilo quintana  has past medical history of anxiety, joint pain and numbness in LUE. Mr. Shilo quintana  has no past surgical history Social History/Living Environment:  
  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 
Independent with activities, however challenged with use of computer/desk sitting secondary to pain in cervical/thoracic spine and right shoulder. Dominant Side:  
      RIGHT Current Medications:  Pantoprazole 40mg Date Last Reviewed:  3/25/2019 EXAMINATION:  
Observation/Orthostatic Postural Assessment:   
       Shoulder symmetry exhibits right shoulder elevated. Shoulder girdles are right elevated. Scapular position is right protracted and elevated. Clavicles are right elevated . Soft tissue observations indicates restrictions in right greater than left pectoralis major and minor, right subscapularis, upper trapezius right greater than left, levator scapula, right greater than left, thoracic spine paraspinals. Patient exhibits a increased cervical lordosis,  neutral thoracic kyphosis with convex right type I curve , and slight increased lumbar lordosis. Lower quadrant bony landmarks are left iliac crest elevated.    
Palpation:  Myofascial assessment indicates restrictions in anterior chest. Muscle length testing levator scapulae with ipsilateral arm abduction is restricted right. Upper trapezius length is restricted right greater than left. Pectoralis minor/major and latissimus dorsi length is restricted right greater than left. Scalene muscle length is restricted right greater than left. Improving 3/25/2019 ROM: Gross active cervical spine rotation is 85% available right, 85% left. Apley's scratch test for functional ER/IR is right base of skull, L5 for IR. Passive Accessory Motion: Glenohumeral mobility is restricted for inferior glide. Acromioclavicular mobility is decreased inferior glide. Sternoclavicular mobility is decreased p/a.improving 3/25/2019 Scapulothoracic mobility is restricted in posterior depression of right scapula. Improving 3/25/2019 AROM(PROM) in degrees Right Left Shoulder flexion 0-155 0-160 Shoulder abduction (palm down) 0-100 0-155 Shoulder extension 0-20 0-20 Shoulder internal rotation (IR) 
(measured at 90 degrees of abduction) 0-35 0-45 ER (measured at 90 degrees of abduction) 0-40 0-50 Strength:  
Manual Muscle Test (*/5) Right Left Shoulder flexion 4 5 Shoulder extension 4+ 5 Shoulder abduction 4 5 Shoulder adduction 4+ 5 Shoulder ER 4+ 5 Shoulder IR 4+ 5 Upper trapezius 4+ 5 Middle trapezius 4 4 Lower trapezius 4 4 Rhomboids 4 4 Serratus Anterior 4 4 Elbow flexion 5 5 Elbow extension 5 5 Special Tests:   
Impingement tests performed on the right shoulder: 
Olivas-Hurley test: positive. Neer test: negative. Neurological Screen: Myotomes: Key muscle strength testing for bilateral UE is intact. Dermatomes: Sensation testing through bilateral UE for light touch is intact. Reflexes: Biceps (C5), brachioradialis (C6), and triceps (C7) are not tested today. Neural tension tests: Upper limb tension test A is negative for exacerbation of patient's symptoms. Functional Mobility:  Challenged with sleeping positions, desk ergonomics and sitting posture Body Structures Involved: 1. Nerves 2. Thoracic Cage 3. Joints 4. Muscles Body Functions Affected: 1. Sensory/Pain 2. Neuromusculoskeletal 
3. Movement Related Activities and Participation Affected: 1. Mobility 2. Self Care 3. Community, Social and [place] CLINICAL DECISION MAKING:  
Outcome Measure: Tool Used: Disabilities of the Arm, Shoulder and Hand (DASH) Questionnaire - Quick Version Score:  Initial: 32/55  Most Recent: 28/55 (Date: 3-25-19 ) Interpretation of Score: The DASH is designed to measure the activities of daily living in person's with upper extremity dysfunction or pain. Each section is scored on a 1-5 scale, 5 representing the greatest disability. The scores of each section are added together for a total score of 55. Medical Necessity:  
· Patient is expected to demonstrate progress in strength, range of motion, balance and coordination to increase independence with ADLs, sleeping positions and work station set up/postures . Reason for Services/Other Comments: 
· Patient continues to require skilled intervention due to increased pain levels in right shoulder and challenged with performing daily activities. .  
 
Time in/out 10:30 
11:30

## 2019-04-08 ENCOUNTER — HOSPITAL ENCOUNTER (OUTPATIENT)
Dept: PHYSICAL THERAPY | Age: 49
Discharge: HOME | End: 2019-04-08
Payer: COMMERCIAL

## 2019-04-08 PROCEDURE — 97110 THERAPEUTIC EXERCISES: CPT

## 2019-04-08 PROCEDURE — 97140 MANUAL THERAPY 1/> REGIONS: CPT

## 2019-04-09 NOTE — PROGRESS NOTES
Ashley Carrillo : 1970 Primary: [facility] Secondary:  Therapy Center at [address] [phone]   [phone] OUTPATIENT PHYSICAL THERAPY: Daily Treatment Note 2019 Pre-treatment Symptoms/Complaints:  Patient continues to note improved ROM in right shoulder, pain continues to be noted with reaching behind back and tightness in right pectoralis. Pain: Initial: Pain Intensity 1: 2 Pain Location 1: Shoulder Pain Orientation 1: Right  Post Session:  2/10 Medications Last Reviewed:  2019 Updated Objective Findings:  Limited inferior glide of right glenohumeral joint AROM(PROM) in degrees Right 2019 Shoulder flexion 0-155 Shoulder abduction (palm down) 0-105 Shoulder extension 0-20 Shoulder internal rotation (IR) 
(measured at 90 degrees of abduction) 0-35 ER (measured at 90 degrees of abduction) 0-45  
  
 
 
 TREATMENT:  
THERAPEUTIC EXERCISE: (15 minutes):  Exercises per grid below to improve mobility, strength, balance and coordination. Required minimal verbal, manual and tactile cues to promote proper body alignment, promote proper body posture, promote proper body mechanics and promote proper body breathing techniques. Progressed resistance, range, repetitions and complexity of movement as indicated. Date: 
2019 Activity/Exercise Parameters Review HEP X 5 minutes Prone Y, T, I on ball Side lying arm circles Seated scapular retraction Prone prop scapular stabilization Supine diaphragmatic breathing X 5 reps, Inferior glide of glenohumeral joint with t-band in door X 10 reps, 10 sec holds, blue band Pectoralis stretching on foam roller 2 x 2 minutes holds. Latissimus pulldown X 10 reps, 5 sec holds Wall pectoralis stretch X 10 reps, 15 sec holds Standing IR shoulder stretch with towel X 5 reps, 15 sec holds MANUAL THERAPY: (30 minutes): Joint mobilization, Soft tissue mobilization and Manipulation was utilized and necessary because of the patient's restricted joint motion and restricted motion of soft tissue. (Used abbreviations: MET - muscle energy technique; PNF - proprioceptive neuromuscular facilitation; NMR - neuromuscular re-education; a/p - anterior to posterior; p/a - posterior to anterior; FMP - functional movement pattern) · Supine anterior chest soft tissue mobilization, bony contour release around superior and inferior clavicle with breathing techniques bilaterally into manubrium and ribs 3-4 anterior. · Supine right shoulder: subscapularis soft tissue mobilization with FMP of shoulder elevation · Side lying left for right soft tissue mobilization of latissimus dorsi with FMP of shoulder abduction · Side lying left for prolonged holds into posterior depression of scapula through phasic shakes. · Side lying left for soft tissue mobilization along right medial border of scapula and along rib cage into intercostals. [facility] Portal 
Treatment/Session Summary: · Response to Treatment:  improved mobility in anterior chest, decreased forward shoulder posture. Improving postural stability strength. . 
· Communication/Consultation:  None today · Equipment provided today:  None today · Recommendations/Intent for next treatment session: Next visit will focus on continued postural and shoulder girdle strengthening, as well as addressing remaining soft tissue restrictions. Treatment Plan of Care Effective Dates:  3/25/19 TO 5/23/19 Total Treatment Billable Duration:  55 minutes PT Patient Time In/Time Out Time In: 7799 Time Out: 2041 Nilesh Cervantes PT Future Appointments Date Time Provider Shilo Alberto 5/6/2019 10:30 AM MELISSA Mccarthy  
5/20/2019 10:30 AM Merril Galeazzi., PT [facility]

## 2019-04-29 ENCOUNTER — HOSPITAL ENCOUNTER (OUTPATIENT)
Dept: PHYSICAL THERAPY | Age: 49
Discharge: HOME | End: 2019-04-29
Payer: COMMERCIAL

## 2019-04-29 PROCEDURE — 97110 THERAPEUTIC EXERCISES: CPT

## 2019-04-29 PROCEDURE — 97140 MANUAL THERAPY 1/> REGIONS: CPT

## 2019-04-29 NOTE — PROGRESS NOTES
Tiffany Parson : 1970 Primary: [facility] Secondary:  Therapy Center at [address] [phone]   [phone] OUTPATIENT PHYSICAL THERAPY: Daily Treatment Note 2019 Pre-treatment Symptoms/Complaints:  Patient notes improving overall mobility in right pectoralis with less pain noted in shoulder however continues to have stiffness. Pain: Initial: Pain Intensity 1: 2 Pain Location 1: Shoulder Pain Orientation 1: Right  Post Session:  1/10 Medications Last Reviewed:  2019 Updated Objective Findings:  Limited inferior glide of right glenohumeral joint AROM(PROM) in degrees Right 2019 Shoulder flexion 0-160 Shoulder abduction (palm down) 0-120 Shoulder extension 0-20 Shoulder internal rotation (IR) 
(measured at 90 degrees of abduction) 0-45 ER (measured at 90 degrees of abduction) 0-45  
  
 
 
 TREATMENT:  
THERAPEUTIC EXERCISE: (15 minutes):  Exercises per grid below to improve mobility, strength, balance and coordination. Required minimal verbal, manual and tactile cues to promote proper body alignment, promote proper body posture, promote proper body mechanics and promote proper body breathing techniques. Progressed resistance, range, repetitions and complexity of movement as indicated. Date: 
2019 Activity/Exercise Parameters Review HEP X 5 minutes Prone Y, T, I on ball Side lying arm circles Seated scapular retraction Prone prop scapular stabilization Supine diaphragmatic breathing X 5 reps, Inferior glide of glenohumeral joint with t-band in door X 10 reps, 10 sec holds, blue band Pectoralis stretching on foam roller 2 x 2 minutes holds. Latissimus pulldown X 10 reps, 5 sec holds Wall pectoralis stretch X 10 reps, 15 sec holds Standing IR shoulder stretch with towel X 5 reps, 15 sec holds Quadriped UE/LE X 10 reps, 10 sec holds MANUAL THERAPY: (45 minutes): Joint mobilization, Soft tissue mobilization and Manipulation was utilized and necessary because of the patient's restricted joint motion and restricted motion of soft tissue. (Used abbreviations: MET - muscle energy technique; PNF - proprioceptive neuromuscular facilitation; NMR - neuromuscular re-education; a/p - anterior to posterior; p/a - posterior to anterior; FMP - functional movement pattern) · Supine anterior chest soft tissue mobilization, bony contour release around superior and inferior clavicle with breathing techniques bilaterally into manubrium and ribs 3-4 anterior. · Supine right shoulder: subscapularis soft tissue mobilization with FMP of shoulder elevation · Side lying left for right soft tissue mobilization of latissimus dorsi with FMP of shoulder abduction · Side lying left for prolonged holds into posterior depression of scapula through phasic shakes. · Side lying left for soft tissue mobilization along right medial border of scapula and along rib cage into intercostals. [facility] Portal 
Treatment/Session Summary: · Response to Treatment:  improved ROM in right shoulder, decreased soft tissue restrictions in pectoralis. Continues to demonstrate progression in overall functional tasks. Rut Wills · Communication/Consultation:  None today · Equipment provided today:  None today · Recommendations/Intent for next treatment session: Next visit will focus on continued postural and shoulder girdle strengthening, as well as addressing remaining soft tissue restrictions. Treatment Plan of Care Effective Dates:  3/25/19 TO 5/23/19 Total Treatment Billable Duration:  55 minutes PT Patient Time In/Time Out Time In: 0930 Time Out: 1030 Liv Pickens PT Future Appointments Date Time Provider Shilo Alberto 5/6/2019 10:30 AM Eloise Walters., PT [facility] 5/20/2019 10:30 AM Christina Walters., PT [facility]

## 2019-05-06 ENCOUNTER — HOSPITAL ENCOUNTER (OUTPATIENT)
Dept: PHYSICAL THERAPY | Age: 49
Discharge: HOME | End: 2019-05-06

## 2019-05-06 NOTE — PROGRESS NOTES
Amanda Mulligan   (XNR:[id number]) Therapy Center at  [address]  [phone]  [phone]             DATE: 5/6/2019    Patient canceled for appointment today due to feeling better and wants to space out visits. Will plan to follow up on next scheduled visit.     Daniel Rounds PT, DPT, CFMT

## 2019-05-20 ENCOUNTER — HOSPITAL ENCOUNTER (OUTPATIENT)
Dept: PHYSICAL THERAPY | Age: 49
Discharge: HOME | End: 2019-05-20

## 2019-05-20 NOTE — PROGRESS NOTES
Madolyn Opitz   (EHO:[id number]) [address]  at  [place] [id number] [address], [address] [place] [address]  [phone]  [phone]             DATE: 5/20/2019    Patient canceled for appointment today due to conflict in schedule and continues to note improvements working independently on his HEP. Will plan to follow up on next scheduled visit.     Meño Stevenson PT, DPT, CFMT

## 2019-08-01 NOTE — THERAPY DISCHARGE
Cristela Hoover : 1970 Primary: [facility] Secondary:  Therapy Center at [facility] [address], [address] [phone]   [phone] OUTPATIENT PHYSICAL THERAPY:Discharge Summary 2019 ICD-10: Treatment Diagnosis: pain in joint, right shoulder M25.511 ICD-10: Treatment Diagnosis: thoracic spine pain M54.6 ICD-10: Treatment Diagnosis: cervicalgia M54.2 Precautions/Allergies: penicillin MD Orders: self-referred MEDICAL/REFERRING DIAGNOSIS: 
Pain in right shoulder [M25.511] Pain in right arm [M79.601] DATE OF ONSET: 2018 REFERRING PHYSICIAN: Sheila Feldman RETURN PHYSICIAN APPOINTMENT: as needed DISCONTINUATION: 19: As of 2019, Cristela Hoover has attended 8 out of 11 scheduled visits, with 3 cancellation(s) and 0 no shows. He has discontinued therapy services at this time. He has met some of his physical therapy goals and continues to work independently on remaining goals. RE-CERTIFICATION: 95: Mr. Venkat Ratliff has benefited from therapy services, he is demonstrating improved ROM in his right shoulder and improved mobility through his shoulder girdle. He continues to demonstrates minor-moderate restrictions in his right glenohumeral joint inferior glide, especially with shoulder elevation. Continues to be challenged with upward rotation of scapula and posterior depression of scapula while coupled with glenohumeral movement. He would benefit from continued therapy services to improve arthrokinematics of his right shoulder girdle, improve remaining strength and endurance deficits and continue to improve awareness of overall posture. INITIAL ASSESSMENT:  Mr. Venkat Ratliff is a 52 y.o. male who presents to physical therapy with right shoulder pain and cervical/thoracic spine pain.  He demonstrates limited ROM in right shoulder girdle, soft tissue restrictions, cervical and thoracic spine arthrokinematic dysfunctions, strength deficits in postural stabilizers. He would benefit from skilled physical therapy to improve overall mobility in right shoulder, cervical/thoracic spine and improve overall strength and endurance. TREATMENT PLAN: 
Effective Dates: 3/25/19 TO 5/23/2019. Frequency/Duration: every other week over the next 60 days. GOALS: (Goals have been discussed and agreed upon with patient.) Discharge Goals: Time Frame: 60 days 1. Patient demonstrates independence with HEP without verbal cueing from therapist. GOAL MET, 
2. Patient demonstrates right shoulder AROM to 0-155 degrees flexion, 0-155 degrees abduction - ALMOST MET 3. Patient able to sleep for 4 consecutive hours with proper pillow and position to decrease strain to right shoulder. - GOAL MET 4. Improve DASH outcome measure score from 32/55 to 15/55 to perform ADLs. - NOT MET Thank you for this referral, Perez Mcpherson PT, DPT, CFMT EXAMINATION:  
 
ROM: 
AROM(PROM) in degrees Right Left Shoulder flexion 0-155 0-160 Shoulder abduction (palm down) 0-100 0-155 Shoulder extension 0-20 0-20 Shoulder internal rotation (IR) 
(measured at 90 degrees of abduction) 0-35 0-45 ER (measured at 90 degrees of abduction) 0-40 0-50 Strength:  
Manual Muscle Test (*/5) Right Left Shoulder flexion 4 5 Shoulder extension 4+ 5 Shoulder abduction 4 5 Shoulder adduction 4+ 5 Shoulder ER 4+ 5 Shoulder IR 4+ 5 Upper trapezius 4+ 5 Middle trapezius 4 4 Lower trapezius 4 4 Rhomboids 4 4 Serratus Anterior 4 4 Elbow flexion 5 5 Elbow extension 5 5 Functional Mobility:  Challenged with sleeping positions, desk ergonomics and sitting posture

## 2022-03-21 ENCOUNTER — HOSPITAL ENCOUNTER (OUTPATIENT)
Dept: LAB | Age: 52
Discharge: HOME | End: 2022-03-21

## 2022-03-21 PROCEDURE — 88305 TISSUE EXAM BY PATHOLOGIST: CPT

## 2022-03-21 PROCEDURE — 88312 SPECIAL STAINS GROUP 1: CPT
